# Patient Record
Sex: FEMALE | Race: WHITE | Employment: FULL TIME | URBAN - METROPOLITAN AREA
[De-identification: names, ages, dates, MRNs, and addresses within clinical notes are randomized per-mention and may not be internally consistent; named-entity substitution may affect disease eponyms.]

---

## 2017-04-26 LAB
CHOLESTEROL, TOTAL: 165 MG/DL
CHOLESTEROL/HDL RATIO: 2.4
HDLC SERPL-MCNC: 68 MG/DL (ref 35–70)
LDL CHOLESTEROL CALCULATED: 85 MG/DL (ref 0–160)
TRIGL SERPL-MCNC: 62 MG/DL
VLDLC SERPL CALC-MCNC: NORMAL MG/DL

## 2017-05-04 ENCOUNTER — TELEPHONE (OUTPATIENT)
Dept: FAMILY MEDICINE CLINIC | Age: 37
End: 2017-05-04

## 2017-09-12 ENCOUNTER — OFFICE VISIT (OUTPATIENT)
Dept: FAMILY MEDICINE CLINIC | Age: 37
End: 2017-09-12
Payer: COMMERCIAL

## 2017-09-12 VITALS
HEIGHT: 68 IN | HEART RATE: 68 BPM | DIASTOLIC BLOOD PRESSURE: 80 MMHG | TEMPERATURE: 98.2 F | SYSTOLIC BLOOD PRESSURE: 110 MMHG | RESPIRATION RATE: 16 BRPM | WEIGHT: 213 LBS | BODY MASS INDEX: 32.28 KG/M2

## 2017-09-12 DIAGNOSIS — J30.9 ALLERGIC RHINITIS, UNSPECIFIED ALLERGIC RHINITIS TRIGGER, UNSPECIFIED RHINITIS SEASONALITY: ICD-10-CM

## 2017-09-12 DIAGNOSIS — D72.18 CHURG-STRAUSS SYNDROME (HCC): ICD-10-CM

## 2017-09-12 DIAGNOSIS — Z00.00 WELL ADULT EXAM: Primary | ICD-10-CM

## 2017-09-12 DIAGNOSIS — J45.20 MILD INTERMITTENT ASTHMA WITHOUT COMPLICATION: ICD-10-CM

## 2017-09-12 DIAGNOSIS — M30.1 CHURG-STRAUSS SYNDROME (HCC): ICD-10-CM

## 2017-09-12 DIAGNOSIS — K21.9 GASTROESOPHAGEAL REFLUX DISEASE, ESOPHAGITIS PRESENCE NOT SPECIFIED: ICD-10-CM

## 2017-09-12 PROCEDURE — 99395 PREV VISIT EST AGE 18-39: CPT | Performed by: FAMILY MEDICINE

## 2017-09-12 RX ORDER — OMEPRAZOLE 20 MG/1
20 CAPSULE, DELAYED RELEASE ORAL DAILY
Qty: 90 CAPSULE | Refills: 3 | Status: SHIPPED | OUTPATIENT
Start: 2017-09-12 | End: 2018-08-31 | Stop reason: SDUPTHER

## 2017-09-12 RX ORDER — FLUTICASONE PROPIONATE 50 MCG
2 SPRAY, SUSPENSION (ML) NASAL
COMMUNITY
End: 2018-09-18 | Stop reason: ALTCHOICE

## 2017-09-12 ASSESSMENT — PATIENT HEALTH QUESTIONNAIRE - PHQ9
SUM OF ALL RESPONSES TO PHQ9 QUESTIONS 1 & 2: 0
2. FEELING DOWN, DEPRESSED OR HOPELESS: 0
1. LITTLE INTEREST OR PLEASURE IN DOING THINGS: 0
SUM OF ALL RESPONSES TO PHQ QUESTIONS 1-9: 0

## 2017-09-12 ASSESSMENT — ENCOUNTER SYMPTOMS
BLOOD IN STOOL: 0
NAUSEA: 0
CONSTIPATION: 0
DIARRHEA: 0
ABDOMINAL PAIN: 0
SHORTNESS OF BREATH: 0
VOMITING: 0
ANAL BLEEDING: 0
CHEST TIGHTNESS: 0

## 2017-11-28 ENCOUNTER — OFFICE VISIT (OUTPATIENT)
Dept: RHEUMATOLOGY | Age: 37
End: 2017-11-28
Payer: COMMERCIAL

## 2017-11-28 VITALS
SYSTOLIC BLOOD PRESSURE: 132 MMHG | HEIGHT: 68 IN | DIASTOLIC BLOOD PRESSURE: 86 MMHG | WEIGHT: 210.4 LBS | BODY MASS INDEX: 31.89 KG/M2 | RESPIRATION RATE: 16 BRPM | HEART RATE: 97 BPM

## 2017-11-28 DIAGNOSIS — D72.18 CHURG-STRAUSS SYNDROME (HCC): Primary | ICD-10-CM

## 2017-11-28 DIAGNOSIS — M30.1 CHURG-STRAUSS SYNDROME (HCC): Primary | ICD-10-CM

## 2017-11-28 DIAGNOSIS — Z79.52 CURRENT CHRONIC USE OF SYSTEMIC STEROIDS: ICD-10-CM

## 2017-11-28 PROCEDURE — G8427 DOCREV CUR MEDS BY ELIG CLIN: HCPCS | Performed by: INTERNAL MEDICINE

## 2017-11-28 PROCEDURE — 99244 OFF/OP CNSLTJ NEW/EST MOD 40: CPT | Performed by: INTERNAL MEDICINE

## 2017-11-28 PROCEDURE — G8417 CALC BMI ABV UP PARAM F/U: HCPCS | Performed by: INTERNAL MEDICINE

## 2017-11-28 PROCEDURE — G8484 FLU IMMUNIZE NO ADMIN: HCPCS | Performed by: INTERNAL MEDICINE

## 2017-11-28 RX ORDER — CLARITHROMYCIN 500 MG/1
500 TABLET, COATED ORAL DAILY
COMMUNITY
End: 2018-04-17

## 2017-11-28 RX ORDER — BUDESONIDE 0.5 MG/2ML
1 INHALANT ORAL 2 TIMES DAILY
COMMUNITY
End: 2018-09-18 | Stop reason: ALTCHOICE

## 2017-11-28 ASSESSMENT — ENCOUNTER SYMPTOMS
EYES NEGATIVE: 1
HEMOPTYSIS: 0
COUGH: 0
WHEEZING: 0
SHORTNESS OF BREATH: 0
HEARTBURN: 1

## 2017-11-28 NOTE — PROGRESS NOTES
[facility]  Rheumatology Adult Consult/Referral Note       11/28/2017  MRN: [id number]      HPI:   Gasper Byrnes  is a(n)37 y.o. female with a hx of churg-Connie syndrome, Chronic sinusitis, GERD, Mild intermittent Asthma, allergic rhinitis referred by Dr. Niki Burciaga for evaluation of her Churg-Connie. Per pt and chart review she had a long standing h/o allergies and asthma, that worsened around 2002. In 2006 she began having progressive sxs including sinonasal symptoms,  Surgery in 10/2007 with septal deviation and polyp removal. Severe recurrent asthma, increasing eosinophilia up to 55%, severe arthralgias w/o swelling, fatigue, numbness in bilateral feet, and skin rash (legs and forearms) with bx revealing pervsacular dermatitis with prominent eosinophils. Stablized with prednisone during 2008 pregnancy. Started seeing Dr. Shane Stark in 2008 and diagnosed with Gegekareem Ashton (EGPA). She had Recurrence of rash in 2009 on b/l lower extremities that resolved with prednisone but returned with tapering. Initialy evaluated at [place] [facility] 8/18/09 at which time Azathioprine was discussed which she started on 9/09. Leah Rankin Sxs improved and prednisone was taper w/o recurrence of rash. She wanted to pursue another child so the imuran was tapered and discontinued on 2/12. She conceived and her pregnancy went well and was far less complicated than her first pregnancy. Daughter deliver on 12/12. Increased sinonasal sxs and asthmatic features with peripheral einophila increased around her 7/13 evaluation. Imuran restarted. In 3/2016 Mepolizumab 100mg/victor hugo was started and her asthma significantly improved, w/ improvement of the PFT, Exertional SOB. Pt weaned off Imuran on 9/16. Prednisone down to 7mg daily 1/17. continued recurrent sinusitis from nasal polyps but her asthma and vasculitis were controlled. SEEING Allergist and ENT currently. Last prednisone burst ~ 6 months ago.         She report diagnoses linked to this encounter. Churg-crystal syndrome  - dx'ed 5/2009: prolonged h/o allergies, recurrent asthma. Increased sinonasal sx's aroudn 2006. 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 (+) skin rash on bilateral legs, and Rt arm w/ biopsy revealing perivascular dermatitis w/ prominent eosinophils. Paresthesias resoled after therapy started. Negative ANCA's in 2009.   - Prior tx AZA (9/09-2/12,7/13-9/16)  - followed and Treated by Dr. Opal Shearer at [facility] since 2009. - Current therapy    - Mepolizumab 100mg q month (3/16-present). Noted  Dr. Opal Shearer recommendation of increasing dosing if becomes FDA approved. Currently being administered by Allergist in [place], [place]   - Prednisone currently 5mg daily,(5/09-present)  intermittent prednisone burst.    - discussed restarting imuran as an attempt to wean the prednisone and improved sinopulmonary sx's. Pt reports improvement of sinopulmonary sx's  with higher dosing of prednisone. Chronic systemic steroids. - prednisone 5mg daily with intermittent burst depending upon sinus issues. - discussed calcium and vitamin supplementation.   - DXA normal in 2016    Medication monitoring:   - CBC, CMP, ESR, CRP, U/A     Return in about 18 weeks (around 4/3/2018).  ~2 months after appointment with Dr. Opal Shearer     Electronically signed by Amrita Sung DO on 11/28/2017 at 1:53 PM

## 2018-01-11 ENCOUNTER — HOSPITAL ENCOUNTER (OUTPATIENT)
Dept: GENERAL RADIOLOGY | Age: 38
Discharge: HOME | End: 2018-01-11

## 2018-01-11 ENCOUNTER — HOSPITAL ENCOUNTER (OUTPATIENT)
Age: 38
Discharge: HOME | End: 2018-01-11

## 2018-01-11 DIAGNOSIS — R52 PAIN: ICD-10-CM

## 2018-02-15 ENCOUNTER — HOSPITAL ENCOUNTER (OUTPATIENT)
Dept: ULTRASOUND IMAGING | Age: 38
Discharge: HOME | End: 2018-02-15

## 2018-02-15 ENCOUNTER — HOSPITAL ENCOUNTER (OUTPATIENT)
Age: 38
Discharge: HOME | End: 2018-02-15

## 2018-02-15 ENCOUNTER — HOSPITAL ENCOUNTER (OUTPATIENT)
Dept: GENERAL RADIOLOGY | Age: 38
Discharge: HOME | End: 2018-02-15

## 2018-02-15 DIAGNOSIS — S80.11XA CONTUSION OF RIGHT LOWER LEG, INITIAL ENCOUNTER: ICD-10-CM

## 2018-02-15 DIAGNOSIS — R52 PAIN: ICD-10-CM

## 2018-02-15 PROCEDURE — 76882 US LMTD JT/FCL EVL NVASC XTR: CPT

## 2018-04-17 ENCOUNTER — OFFICE VISIT (OUTPATIENT)
Dept: RHEUMATOLOGY | Age: 38
End: 2018-04-17
Payer: COMMERCIAL

## 2018-04-17 VITALS
HEIGHT: 68 IN | BODY MASS INDEX: 32.98 KG/M2 | HEART RATE: 100 BPM | RESPIRATION RATE: 17 BRPM | DIASTOLIC BLOOD PRESSURE: 91 MMHG | WEIGHT: 217.6 LBS | SYSTOLIC BLOOD PRESSURE: 136 MMHG

## 2018-04-17 DIAGNOSIS — D72.18 CHURG-STRAUSS SYNDROME (HCC): Primary | ICD-10-CM

## 2018-04-17 DIAGNOSIS — M30.1 CHURG-STRAUSS SYNDROME (HCC): Primary | ICD-10-CM

## 2018-04-17 DIAGNOSIS — Z79.52 CURRENT CHRONIC USE OF SYSTEMIC STEROIDS: ICD-10-CM

## 2018-04-17 PROCEDURE — G8417 CALC BMI ABV UP PARAM F/U: HCPCS | Performed by: INTERNAL MEDICINE

## 2018-04-17 PROCEDURE — 1036F TOBACCO NON-USER: CPT | Performed by: INTERNAL MEDICINE

## 2018-04-17 PROCEDURE — 99214 OFFICE O/P EST MOD 30 MIN: CPT | Performed by: INTERNAL MEDICINE

## 2018-04-17 PROCEDURE — G8427 DOCREV CUR MEDS BY ELIG CLIN: HCPCS | Performed by: INTERNAL MEDICINE

## 2018-04-17 RX ORDER — PREDNISONE 1 MG/1
3 TABLET ORAL DAILY
Qty: 90 TABLET | Refills: 1 | Status: SHIPPED | OUTPATIENT
Start: 2018-04-17 | End: 2018-10-02 | Stop reason: SDUPTHER

## 2018-04-17 RX ORDER — SULFAMETHOXAZOLE AND TRIMETHOPRIM 800; 160 MG/1; MG/1
TABLET ORAL
Qty: 36 TABLET | Refills: 1 | Status: SHIPPED | OUTPATIENT
Start: 2018-04-17 | End: 2018-10-02 | Stop reason: SDUPTHER

## 2018-04-17 RX ORDER — PREDNISONE 1 MG/1
20 TABLET ORAL DAILY
Qty: 120 TABLET | Refills: 1 | Status: SHIPPED | OUTPATIENT
Start: 2018-04-17 | End: 2018-10-02 | Stop reason: SDUPTHER

## 2018-04-17 ASSESSMENT — ENCOUNTER SYMPTOMS
DOUBLE VISION: 0
EYE DISCHARGE: 0
HEARTBURN: 1
SHORTNESS OF BREATH: 0
WHEEZING: 0
COUGH: 0
BLURRED VISION: 0
EYE PAIN: 0
SINUS PAIN: 1
PHOTOPHOBIA: 0
HEMOPTYSIS: 0

## 2018-05-10 LAB
CHOLESTEROL, TOTAL: 168 MG/DL
CHOLESTEROL/HDL RATIO: NORMAL
HDLC SERPL-MCNC: 69 MG/DL (ref 35–70)
LDL CHOLESTEROL CALCULATED: 89 MG/DL (ref 0–160)
TRIGL SERPL-MCNC: 52 MG/DL
VLDLC SERPL CALC-MCNC: NORMAL MG/DL

## 2018-08-31 DIAGNOSIS — K21.9 GASTROESOPHAGEAL REFLUX DISEASE, ESOPHAGITIS PRESENCE NOT SPECIFIED: ICD-10-CM

## 2018-08-31 RX ORDER — OMEPRAZOLE 20 MG/1
CAPSULE, DELAYED RELEASE ORAL
Qty: 90 CAPSULE | Refills: 3 | Status: SHIPPED | OUTPATIENT
Start: 2018-08-31 | End: 2019-08-07 | Stop reason: SDUPTHER

## 2018-08-31 NOTE — TELEPHONE ENCOUNTER
EP request from [facility] for refill on Omeprazole  Last seen 9/12/17 and next appt 9/18/18  Order pending

## 2018-09-18 ENCOUNTER — OFFICE VISIT (OUTPATIENT)
Dept: FAMILY MEDICINE CLINIC | Age: 38
End: 2018-09-18
Payer: COMMERCIAL

## 2018-09-18 VITALS
WEIGHT: 195 LBS | DIASTOLIC BLOOD PRESSURE: 80 MMHG | RESPIRATION RATE: 16 BRPM | TEMPERATURE: 97.3 F | HEART RATE: 76 BPM | BODY MASS INDEX: 29.86 KG/M2 | SYSTOLIC BLOOD PRESSURE: 124 MMHG

## 2018-09-18 DIAGNOSIS — R73.01 IFG (IMPAIRED FASTING GLUCOSE): ICD-10-CM

## 2018-09-18 DIAGNOSIS — J45.20 MILD INTERMITTENT ASTHMA WITHOUT COMPLICATION: ICD-10-CM

## 2018-09-18 DIAGNOSIS — D72.18 CHURG-STRAUSS SYNDROME (HCC): ICD-10-CM

## 2018-09-18 DIAGNOSIS — K21.9 GASTROESOPHAGEAL REFLUX DISEASE, ESOPHAGITIS PRESENCE NOT SPECIFIED: ICD-10-CM

## 2018-09-18 DIAGNOSIS — J30.9 ALLERGIC RHINITIS, UNSPECIFIED SEASONALITY, UNSPECIFIED TRIGGER: ICD-10-CM

## 2018-09-18 DIAGNOSIS — Z00.00 WELL ADULT EXAM: Primary | ICD-10-CM

## 2018-09-18 DIAGNOSIS — M30.1 CHURG-STRAUSS SYNDROME (HCC): ICD-10-CM

## 2018-09-18 PROCEDURE — 99395 PREV VISIT EST AGE 18-39: CPT | Performed by: FAMILY MEDICINE

## 2018-09-18 ASSESSMENT — ENCOUNTER SYMPTOMS
VOMITING: 0
DIARRHEA: 0
ANAL BLEEDING: 0
NAUSEA: 0
ABDOMINAL PAIN: 0
CHEST TIGHTNESS: 0
SHORTNESS OF BREATH: 0
BLOOD IN STOOL: 0
CONSTIPATION: 0

## 2018-09-18 ASSESSMENT — PATIENT HEALTH QUESTIONNAIRE - PHQ9
SUM OF ALL RESPONSES TO PHQ QUESTIONS 1-9: 0
SUM OF ALL RESPONSES TO PHQ QUESTIONS 1-9: 0
2. FEELING DOWN, DEPRESSED OR HOPELESS: 0
SUM OF ALL RESPONSES TO PHQ9 QUESTIONS 1 & 2: 0
1. LITTLE INTEREST OR PLEASURE IN DOING THINGS: 0

## 2018-09-18 NOTE — PROGRESS NOTES
Nose normal.   Mouth/Throat: Oropharynx is clear and moist.   Eyes: Pupils are equal, round, and reactive to light. Conjunctivae and EOM are normal.   Neck: Normal range of motion. Neck supple. Cardiovascular: Normal rate, regular rhythm, normal heart sounds and intact distal pulses. Pulmonary/Chest: Effort normal and breath sounds normal.   Abdominal: Soft. Bowel sounds are normal.   Musculoskeletal: Normal range of motion. Neurological: She is alert and oriented to person, place, and time. She has normal reflexes. Skin: Skin is warm and dry. Psychiatric: She has a normal mood and affect. Her behavior is normal. Judgment and thought content normal.   Nursing note and vitals reviewed. Lab Results   Component Value Date    CHOL 168 05/10/2018    TRIG 52 05/10/2018    HDL 69 05/10/2018    LDLCALC 89 05/10/2018       Lab Results   Component Value Date    GLUCOSE 100 05/11/2016         Immunization History   Administered Date(s) Administered    Influenza Virus Vaccine 10/01/2014, 10/01/2015    Pneumococcal Polysaccharide (Gvemcwent25) 01/01/2008    Tdap (Boostrix, Adacel) 12/17/2012       Health Maintenance   Topic Date Due    Cervical cancer screen  05/01/2018    Flu vaccine (1) 09/01/2018    DTaP/Tdap/Td vaccine (2 - Td) 12/17/2022    Pneumococcal med risk  Completed    HIV screen  Completed       AAA ultrasound (Male, 65-75, smoked ever) indicated at this time? NO  CT Lung Screen (55-80, 30 pk-yrs, smoking or quit <15 years) indicated at this time? NO    Future Appointments  Date Time Provider Mihaela Dong   10/2/2018 8:00 AM Darlys Kanner, DO [facility] - [place] [place]         ASSESSMENT       Diagnosis Orders   1. Well adult exam     2. Churg-Connie syndrome ([address]     3. IFG (impaired fasting glucose)  Hemoglobin A1C   4. Allergic rhinitis, unspecified seasonality, unspecified trigger     5. Gastroesophageal reflux disease, esophagitis presence not specified     6.  Mild intermittent asthma without complication         PLAN     Encouraged annual FLU VACCINE after October 1st- to get per employer. Encouraged PREVNAR 13 at this time- pt to let us know when desires   Plan 2nd PNEUMOVAX 23 (8 weeks later)  due to chronic immunosuppression with corticosteroids.     PAP/PELVIC management per Dr. Kassandra Mott- last appt 6/2018- to follow up annually. DEXA management per [place]. Bhavin Stahl [place] [facility] [place] (done at [facility]) due to persistent steroid use- pt to drop off to office. Please decrease Calcium with Vitamin D supplementation (Viactiv Chews) to 1 pill daily if ok with Dr. Sami Sargent due to recent reports of increasing risk of calcifying coronary arteries. Check HGA1C at this time- order given. Remaining labs managed per [facility] [place]. Continue Omeprazole- 20 mg- 1 pill daily. Follow up with Dr. Giorgio Lanza, Dr. Bhaivn Stahl [facility];inic- Rheumatology) , Dr. Sukumar Smith (Allergy/ Asthma), and Dr. River Castellano (Sinus). Continue to work on diet, exercise, and weight loss for optimal cardiovascular health. Continue current medicines. No refills needed today.    Follow up in 12 months.        Electronically signed by Lloyd Kathleen MD on 9/18/2018 at 8:39 AM

## 2018-10-02 ENCOUNTER — OFFICE VISIT (OUTPATIENT)
Dept: RHEUMATOLOGY | Age: 38
End: 2018-10-02
Payer: COMMERCIAL

## 2018-10-02 VITALS
WEIGHT: 192.8 LBS | SYSTOLIC BLOOD PRESSURE: 129 MMHG | OXYGEN SATURATION: 100 % | BODY MASS INDEX: 29.53 KG/M2 | DIASTOLIC BLOOD PRESSURE: 82 MMHG | HEART RATE: 70 BPM

## 2018-10-02 DIAGNOSIS — D72.18 CHURG-STRAUSS SYNDROME (HCC): Primary | ICD-10-CM

## 2018-10-02 DIAGNOSIS — M30.1 CHURG-STRAUSS SYNDROME (HCC): Primary | ICD-10-CM

## 2018-10-02 DIAGNOSIS — Z79.52 CURRENT CHRONIC USE OF SYSTEMIC STEROIDS: ICD-10-CM

## 2018-10-02 PROCEDURE — G8417 CALC BMI ABV UP PARAM F/U: HCPCS | Performed by: INTERNAL MEDICINE

## 2018-10-02 PROCEDURE — 99213 OFFICE O/P EST LOW 20 MIN: CPT | Performed by: INTERNAL MEDICINE

## 2018-10-02 PROCEDURE — G8427 DOCREV CUR MEDS BY ELIG CLIN: HCPCS | Performed by: INTERNAL MEDICINE

## 2018-10-02 PROCEDURE — G8484 FLU IMMUNIZE NO ADMIN: HCPCS | Performed by: INTERNAL MEDICINE

## 2018-10-02 PROCEDURE — 1036F TOBACCO NON-USER: CPT | Performed by: INTERNAL MEDICINE

## 2018-10-02 RX ORDER — SULFAMETHOXAZOLE AND TRIMETHOPRIM 800; 160 MG/1; MG/1
TABLET ORAL
Qty: 36 TABLET | Refills: 1 | Status: SHIPPED | OUTPATIENT
Start: 2018-10-02 | End: 2019-10-01 | Stop reason: SDUPTHER

## 2018-10-02 RX ORDER — PREDNISONE 1 MG/1
20 TABLET ORAL DAILY
Qty: 120 TABLET | Refills: 1 | Status: SHIPPED | OUTPATIENT
Start: 2018-10-02 | End: 2019-04-09 | Stop reason: SDUPTHER

## 2018-10-02 RX ORDER — PREDNISONE 1 MG/1
3 TABLET ORAL DAILY
Qty: 90 TABLET | Refills: 1 | Status: SHIPPED | OUTPATIENT
Start: 2018-10-02 | End: 2019-04-09 | Stop reason: SDUPTHER

## 2018-10-02 ASSESSMENT — ENCOUNTER SYMPTOMS
PHOTOPHOBIA: 0
ORTHOPNEA: 0
HEMOPTYSIS: 0
DOUBLE VISION: 0
BACK PAIN: 0
SHORTNESS OF BREATH: 0
WHEEZING: 0
EYE DISCHARGE: 0
SINUS PAIN: 1
EYE PAIN: 0
HEARTBURN: 1
BLURRED VISION: 0
COUGH: 0

## 2018-10-02 NOTE — PROGRESS NOTES
1500mg day of supplementation, and vitamin D supplementation.   - DXA normal in 2016  - Bactrim DS M/W/F     Medication monitoring:   - CBC, CMP, ESR, CRP, U/A -- request labs from [facility]. Return in about 6 months (around 4/2/2019).     Electronically signed by Elbert Chery DO on 10/2/2018 at 7:55 AM

## 2018-10-19 LAB
AVERAGE GLUCOSE: 102
HBA1C MFR BLD: 5.4 %

## 2019-03-01 ENCOUNTER — TELEPHONE (OUTPATIENT)
Dept: FAMILY MEDICINE CLINIC | Age: 39
End: 2019-03-01

## 2019-03-18 ENCOUNTER — TELEPHONE (OUTPATIENT)
Dept: FAMILY MEDICINE CLINIC | Age: 39
End: 2019-03-18

## 2019-03-19 ENCOUNTER — HOSPITAL ENCOUNTER (INPATIENT)
Age: 39
LOS: 3 days | Discharge: HOME | DRG: 419 | End: 2019-03-22
Attending: INTERNAL MEDICINE | Admitting: INTERNAL MEDICINE
Payer: COMMERCIAL

## 2019-03-19 ENCOUNTER — NURSE TRIAGE (OUTPATIENT)
Dept: ADMINISTRATIVE | Age: 39
End: 2019-03-19

## 2019-03-19 ENCOUNTER — APPOINTMENT (OUTPATIENT)
Dept: ULTRASOUND IMAGING | Age: 39
DRG: 419 | End: 2019-03-19
Payer: COMMERCIAL

## 2019-03-19 ENCOUNTER — TELEPHONE (OUTPATIENT)
Dept: FAMILY MEDICINE CLINIC | Age: 39
End: 2019-03-19

## 2019-03-19 ENCOUNTER — APPOINTMENT (OUTPATIENT)
Dept: MRI IMAGING | Age: 39
DRG: 419 | End: 2019-03-19
Payer: COMMERCIAL

## 2019-03-19 DIAGNOSIS — R10.11 ABDOMINAL PAIN, RIGHT UPPER QUADRANT: Primary | ICD-10-CM

## 2019-03-19 DIAGNOSIS — R74.8 ELEVATED LIVER ENZYMES: ICD-10-CM

## 2019-03-19 PROBLEM — K80.43 CHOLEDOCHOLITHIASIS WITH ACUTE CHOLECYSTITIS WITH OBSTRUCTION: Status: ACTIVE | Noted: 2019-03-19

## 2019-03-19 LAB
ALBUMIN SERPL-MCNC: 3.9 G/DL (ref 3.5–5.1)
ALP BLD-CCNC: 207 U/L (ref 38–126)
ALT SERPL-CCNC: 341 U/L (ref 11–66)
ANION GAP SERPL CALCULATED.3IONS-SCNC: 16 MEQ/L (ref 8–16)
AST SERPL-CCNC: 416 U/L (ref 5–40)
BACTERIA: ABNORMAL /HPF
BASOPHILS # BLD: 0.5 %
BASOPHILS ABSOLUTE: 0 THOU/MM3 (ref 0–0.1)
BILIRUB SERPL-MCNC: 4.3 MG/DL (ref 0.3–1.2)
BILIRUBIN URINE: ABNORMAL
BLOOD, URINE: ABNORMAL
BUN BLDV-MCNC: 9 MG/DL (ref 7–22)
CALCIUM SERPL-MCNC: 9.5 MG/DL (ref 8.5–10.5)
CASTS 2: ABNORMAL /LPF
CASTS UA: ABNORMAL /LPF
CHARACTER, URINE: CLEAR
CHLORIDE BLD-SCNC: 101 MEQ/L (ref 98–111)
CO2: 23 MEQ/L (ref 23–33)
COLOR: ABNORMAL
CREAT SERPL-MCNC: 0.4 MG/DL (ref 0.4–1.2)
CRYSTALS, UA: ABNORMAL
EKG ATRIAL RATE: 62 BPM
EKG P AXIS: 17 DEGREES
EKG P-R INTERVAL: 132 MS
EKG Q-T INTERVAL: 488 MS
EKG QRS DURATION: 82 MS
EKG QTC CALCULATION (BAZETT): 495 MS
EKG R AXIS: 51 DEGREES
EKG T AXIS: 61 DEGREES
EKG VENTRICULAR RATE: 62 BPM
EOSINOPHIL # BLD: 0.2 %
EOSINOPHILS ABSOLUTE: 0 THOU/MM3 (ref 0–0.4)
EPITHELIAL CELLS, UA: ABNORMAL /HPF
ERYTHROCYTE [DISTWIDTH] IN BLOOD BY AUTOMATED COUNT: 13.2 % (ref 11.5–14.5)
ERYTHROCYTE [DISTWIDTH] IN BLOOD BY AUTOMATED COUNT: 44.6 FL (ref 35–45)
GFR SERPL CREATININE-BSD FRML MDRD: > 90 ML/MIN/1.73M2
GLUCOSE BLD-MCNC: 119 MG/DL (ref 70–108)
GLUCOSE URINE: ABNORMAL MG/DL
HCT VFR BLD CALC: 47.8 % (ref 37–47)
HEMOGLOBIN: 16.3 GM/DL (ref 12–16)
ICTOTEST: POSITIVE
IMMATURE GRANS (ABS): 0.03 THOU/MM3 (ref 0–0.07)
IMMATURE GRANULOCYTES: 0.4 %
KETONES, URINE: ABNORMAL
LEUKOCYTE ESTERASE, URINE: ABNORMAL
LIPASE: 19.3 U/L (ref 5.6–51.3)
LYMPHOCYTES # BLD: 17.2 %
LYMPHOCYTES ABSOLUTE: 1.4 THOU/MM3 (ref 1–4.8)
MCH RBC QN AUTO: 30.9 PG (ref 26–33)
MCHC RBC AUTO-ENTMCNC: 34.1 GM/DL (ref 32.2–35.5)
MCV RBC AUTO: 90.7 FL (ref 81–99)
MISCELLANEOUS 2: ABNORMAL
MONOCYTES # BLD: 8.8 %
MONOCYTES ABSOLUTE: 0.7 THOU/MM3 (ref 0.4–1.3)
NITRITE, URINE: ABNORMAL
NUCLEATED RED BLOOD CELLS: 0 /100 WBC
OSMOLALITY CALCULATION: 279.2 MOSMOL/KG (ref 275–300)
PH UA: ABNORMAL (ref 5–9)
PLATELET # BLD: 263 THOU/MM3 (ref 130–400)
PMV BLD AUTO: 12.3 FL (ref 9.4–12.4)
POTASSIUM SERPL-SCNC: 4.1 MEQ/L (ref 3.5–5.2)
PREGNANCY, SERUM: NEGATIVE
PROTEIN UA: ABNORMAL
RBC # BLD: 5.27 MILL/MM3 (ref 4.2–5.4)
RBC URINE: ABNORMAL /HPF
REASON FOR REJECTION: NORMAL
REJECTED TEST: NORMAL
RENAL EPITHELIAL, UA: ABNORMAL
SEG NEUTROPHILS: 72.9 %
SEGMENTED NEUTROPHILS ABSOLUTE COUNT: 6.1 THOU/MM3 (ref 1.8–7.7)
SODIUM BLD-SCNC: 140 MEQ/L (ref 135–145)
SPECIFIC GRAVITY, URINE: ABNORMAL (ref 1–1.03)
TOTAL PROTEIN: 7.1 G/DL (ref 6.1–8)
TROPONIN T: < 0.01 NG/ML
TROPONIN T: < 0.01 NG/ML
UROBILINOGEN, URINE: ABNORMAL EU/DL (ref 0–1)
WBC # BLD: 8.4 THOU/MM3 (ref 4.8–10.8)
WBC UA: ABNORMAL /HPF
YEAST: ABNORMAL

## 2019-03-19 PROCEDURE — 6370000000 HC RX 637 (ALT 250 FOR IP): Performed by: INTERNAL MEDICINE

## 2019-03-19 PROCEDURE — 6360000002 HC RX W HCPCS: Performed by: INTERNAL MEDICINE

## 2019-03-19 PROCEDURE — 80053 COMPREHEN METABOLIC PANEL: CPT

## 2019-03-19 PROCEDURE — 6360000002 HC RX W HCPCS: Performed by: EMERGENCY MEDICINE

## 2019-03-19 PROCEDURE — 93005 ELECTROCARDIOGRAM TRACING: CPT | Performed by: EMERGENCY MEDICINE

## 2019-03-19 PROCEDURE — 2580000003 HC RX 258: Performed by: EMERGENCY MEDICINE

## 2019-03-19 PROCEDURE — 96374 THER/PROPH/DIAG INJ IV PUSH: CPT

## 2019-03-19 PROCEDURE — 96375 TX/PRO/DX INJ NEW DRUG ADDON: CPT

## 2019-03-19 PROCEDURE — 81001 URINALYSIS AUTO W/SCOPE: CPT

## 2019-03-19 PROCEDURE — 1200000000 HC SEMI PRIVATE

## 2019-03-19 PROCEDURE — 2580000003 HC RX 258: Performed by: INTERNAL MEDICINE

## 2019-03-19 PROCEDURE — 74183 MRI ABD W/O CNTR FLWD CNTR: CPT

## 2019-03-19 PROCEDURE — 85025 COMPLETE CBC W/AUTO DIFF WBC: CPT

## 2019-03-19 PROCEDURE — 84484 ASSAY OF TROPONIN QUANT: CPT

## 2019-03-19 PROCEDURE — A9579 GAD-BASE MR CONTRAST NOS,1ML: HCPCS | Performed by: INTERNAL MEDICINE

## 2019-03-19 PROCEDURE — 99222 1ST HOSP IP/OBS MODERATE 55: CPT | Performed by: INTERNAL MEDICINE

## 2019-03-19 PROCEDURE — 83690 ASSAY OF LIPASE: CPT

## 2019-03-19 PROCEDURE — 96376 TX/PRO/DX INJ SAME DRUG ADON: CPT

## 2019-03-19 PROCEDURE — 36415 COLL VENOUS BLD VENIPUNCTURE: CPT

## 2019-03-19 PROCEDURE — 93010 ELECTROCARDIOGRAM REPORT: CPT | Performed by: INTERNAL MEDICINE

## 2019-03-19 PROCEDURE — 2709999900 HC NON-CHARGEABLE SUPPLY

## 2019-03-19 PROCEDURE — 76705 ECHO EXAM OF ABDOMEN: CPT

## 2019-03-19 PROCEDURE — 84703 CHORIONIC GONADOTROPIN ASSAY: CPT

## 2019-03-19 PROCEDURE — 99285 EMERGENCY DEPT VISIT HI MDM: CPT

## 2019-03-19 PROCEDURE — 6360000004 HC RX CONTRAST MEDICATION: Performed by: INTERNAL MEDICINE

## 2019-03-19 PROCEDURE — 87040 BLOOD CULTURE FOR BACTERIA: CPT

## 2019-03-19 RX ORDER — METOCLOPRAMIDE HYDROCHLORIDE 5 MG/ML
10 INJECTION INTRAMUSCULAR; INTRAVENOUS ONCE
Status: COMPLETED | OUTPATIENT
Start: 2019-03-19 | End: 2019-03-19

## 2019-03-19 RX ORDER — PROMETHAZINE HYDROCHLORIDE 25 MG/1
25 TABLET ORAL EVERY 6 HOURS PRN
Status: DISCONTINUED | OUTPATIENT
Start: 2019-03-19 | End: 2019-03-22 | Stop reason: HOSPADM

## 2019-03-19 RX ORDER — FENTANYL CITRATE 50 UG/ML
50 INJECTION, SOLUTION INTRAMUSCULAR; INTRAVENOUS ONCE
Status: COMPLETED | OUTPATIENT
Start: 2019-03-19 | End: 2019-03-19

## 2019-03-19 RX ORDER — SODIUM CHLORIDE, SODIUM LACTATE, POTASSIUM CHLORIDE, CALCIUM CHLORIDE 600; 310; 30; 20 MG/100ML; MG/100ML; MG/100ML; MG/100ML
INJECTION, SOLUTION INTRAVENOUS CONTINUOUS
Status: DISCONTINUED | OUTPATIENT
Start: 2019-03-19 | End: 2019-03-22

## 2019-03-19 RX ORDER — PANTOPRAZOLE SODIUM 40 MG/1
40 TABLET, DELAYED RELEASE ORAL
Status: DISCONTINUED | OUTPATIENT
Start: 2019-03-20 | End: 2019-03-22 | Stop reason: HOSPADM

## 2019-03-19 RX ORDER — FENTANYL CITRATE 50 UG/ML
INJECTION, SOLUTION INTRAMUSCULAR; INTRAVENOUS
Status: DISPENSED
Start: 2019-03-19 | End: 2019-03-19

## 2019-03-19 RX ORDER — NORGESTIMATE AND ETHINYL ESTRADIOL 0.25-0.035
1 KIT ORAL DAILY
Status: DISCONTINUED | OUTPATIENT
Start: 2019-03-19 | End: 2019-03-22 | Stop reason: HOSPADM

## 2019-03-19 RX ORDER — IBUPROFEN 600 MG/1
600 TABLET ORAL EVERY 6 HOURS PRN
Status: DISCONTINUED | OUTPATIENT
Start: 2019-03-19 | End: 2019-03-21

## 2019-03-19 RX ORDER — BUTALBITAL, ACETAMINOPHEN AND CAFFEINE 50; 325; 40 MG/1; MG/1; MG/1
2 TABLET ORAL ONCE
Status: DISCONTINUED | OUTPATIENT
Start: 2019-03-19 | End: 2019-03-22 | Stop reason: HOSPADM

## 2019-03-19 RX ORDER — SODIUM CHLORIDE 0.9 % (FLUSH) 0.9 %
10 SYRINGE (ML) INJECTION PRN
Status: DISCONTINUED | OUTPATIENT
Start: 2019-03-19 | End: 2019-03-22 | Stop reason: SDUPTHER

## 2019-03-19 RX ORDER — PROMETHAZINE HYDROCHLORIDE 25 MG/1
25 SUPPOSITORY RECTAL EVERY 6 HOURS PRN
Status: DISCONTINUED | OUTPATIENT
Start: 2019-03-19 | End: 2019-03-22 | Stop reason: HOSPADM

## 2019-03-19 RX ORDER — 0.9 % SODIUM CHLORIDE 0.9 %
1000 INTRAVENOUS SOLUTION INTRAVENOUS ONCE
Status: COMPLETED | OUTPATIENT
Start: 2019-03-19 | End: 2019-03-19

## 2019-03-19 RX ORDER — FENTANYL CITRATE 50 UG/ML
25 INJECTION, SOLUTION INTRAMUSCULAR; INTRAVENOUS ONCE
Status: COMPLETED | OUTPATIENT
Start: 2019-03-19 | End: 2019-03-19

## 2019-03-19 RX ORDER — ONDANSETRON 2 MG/ML
4 INJECTION INTRAMUSCULAR; INTRAVENOUS ONCE
Status: COMPLETED | OUTPATIENT
Start: 2019-03-19 | End: 2019-03-19

## 2019-03-19 RX ORDER — ONDANSETRON 2 MG/ML
4 INJECTION INTRAMUSCULAR; INTRAVENOUS EVERY 6 HOURS PRN
Status: DISCONTINUED | OUTPATIENT
Start: 2019-03-19 | End: 2019-03-22 | Stop reason: SDUPTHER

## 2019-03-19 RX ORDER — FENTANYL CITRATE 50 UG/ML
50 INJECTION, SOLUTION INTRAMUSCULAR; INTRAVENOUS
Status: DISCONTINUED | OUTPATIENT
Start: 2019-03-19 | End: 2019-03-22 | Stop reason: HOSPADM

## 2019-03-19 RX ORDER — SODIUM CHLORIDE 9 MG/ML
INJECTION, SOLUTION INTRAVENOUS CONTINUOUS
Status: DISCONTINUED | OUTPATIENT
Start: 2019-03-19 | End: 2019-03-19

## 2019-03-19 RX ORDER — FENTANYL CITRATE 50 UG/ML
50 INJECTION, SOLUTION INTRAMUSCULAR; INTRAVENOUS ONCE
Status: DISCONTINUED | OUTPATIENT
Start: 2019-03-19 | End: 2019-03-19

## 2019-03-19 RX ORDER — SODIUM CHLORIDE 0.9 % (FLUSH) 0.9 %
10 SYRINGE (ML) INJECTION EVERY 12 HOURS SCHEDULED
Status: DISCONTINUED | OUTPATIENT
Start: 2019-03-19 | End: 2019-03-22 | Stop reason: SDUPTHER

## 2019-03-19 RX ADMIN — PREDNISONE 6 MG: 1 TABLET ORAL at 17:56

## 2019-03-19 RX ADMIN — SODIUM CHLORIDE: 9 INJECTION, SOLUTION INTRAVENOUS at 14:30

## 2019-03-19 RX ADMIN — METOCLOPRAMIDE 10 MG: 5 INJECTION, SOLUTION INTRAMUSCULAR; INTRAVENOUS at 10:51

## 2019-03-19 RX ADMIN — GADOTERIDOL 20 ML: 279.3 INJECTION, SOLUTION INTRAVENOUS at 16:06

## 2019-03-19 RX ADMIN — NORGESTIMATE AND ETHINYL ESTRADIOL 1 TABLET: KIT ORAL at 18:48

## 2019-03-19 RX ADMIN — IBUPROFEN 600 MG: 600 TABLET ORAL at 20:16

## 2019-03-19 RX ADMIN — SODIUM CHLORIDE 1000 ML: 9 INJECTION, SOLUTION INTRAVENOUS at 10:08

## 2019-03-19 RX ADMIN — ONDANSETRON 4 MG: 2 INJECTION INTRAMUSCULAR; INTRAVENOUS at 18:48

## 2019-03-19 RX ADMIN — ONDANSETRON 4 MG: 2 INJECTION INTRAMUSCULAR; INTRAVENOUS at 10:08

## 2019-03-19 RX ADMIN — FENTANYL CITRATE 50 MCG: 50 INJECTION INTRAMUSCULAR; INTRAVENOUS at 10:55

## 2019-03-19 RX ADMIN — FENTANYL CITRATE 25 MCG: 50 INJECTION INTRAMUSCULAR; INTRAVENOUS at 10:08

## 2019-03-19 ASSESSMENT — PAIN DESCRIPTION - ORIENTATION: ORIENTATION: RIGHT;UPPER;MID

## 2019-03-19 ASSESSMENT — PAIN SCALES - GENERAL
PAINLEVEL_OUTOF10: 3
PAINLEVEL_OUTOF10: 3
PAINLEVEL_OUTOF10: 8
PAINLEVEL_OUTOF10: 2
PAINLEVEL_OUTOF10: 6
PAINLEVEL_OUTOF10: 8

## 2019-03-19 ASSESSMENT — ENCOUNTER SYMPTOMS
COUGH: 0
EYE PAIN: 0
VOMITING: 1
NAUSEA: 1
ABDOMINAL PAIN: 1
BACK PAIN: 0
SORE THROAT: 0
RHINORRHEA: 0
EYE DISCHARGE: 0
DIARRHEA: 1
WHEEZING: 0
SHORTNESS OF BREATH: 0

## 2019-03-19 ASSESSMENT — PAIN DESCRIPTION - LOCATION
LOCATION: ABDOMEN

## 2019-03-19 ASSESSMENT — PAIN DESCRIPTION - DESCRIPTORS: DESCRIPTORS: ACHING;CRAMPING

## 2019-03-19 ASSESSMENT — PAIN DESCRIPTION - PROGRESSION
CLINICAL_PROGRESSION: NOT CHANGED
CLINICAL_PROGRESSION: NOT CHANGED

## 2019-03-19 ASSESSMENT — PAIN - FUNCTIONAL ASSESSMENT: PAIN_FUNCTIONAL_ASSESSMENT: ACTIVITIES ARE NOT PREVENTED

## 2019-03-19 ASSESSMENT — PAIN DESCRIPTION - FREQUENCY: FREQUENCY: CONTINUOUS

## 2019-03-19 ASSESSMENT — PAIN DESCRIPTION - PAIN TYPE
TYPE: ACUTE PAIN
TYPE: ACUTE PAIN

## 2019-03-19 ASSESSMENT — PAIN DESCRIPTION - ONSET: ONSET: ON-GOING

## 2019-03-19 ASSESSMENT — PAIN DESCRIPTION - DIRECTION: RADIATING_TOWARDS: SHOULDER BLADES

## 2019-03-20 ENCOUNTER — APPOINTMENT (OUTPATIENT)
Dept: GENERAL RADIOLOGY | Age: 39
DRG: 419 | End: 2019-03-20
Payer: COMMERCIAL

## 2019-03-20 ENCOUNTER — ANESTHESIA (OUTPATIENT)
Dept: ENDOSCOPY | Age: 39
DRG: 419 | End: 2019-03-20
Payer: COMMERCIAL

## 2019-03-20 ENCOUNTER — ANESTHESIA EVENT (OUTPATIENT)
Dept: ENDOSCOPY | Age: 39
DRG: 419 | End: 2019-03-20
Payer: COMMERCIAL

## 2019-03-20 VITALS
OXYGEN SATURATION: 100 % | RESPIRATION RATE: 15 BRPM | SYSTOLIC BLOOD PRESSURE: 102 MMHG | DIASTOLIC BLOOD PRESSURE: 67 MMHG

## 2019-03-20 LAB
ALBUMIN SERPL-MCNC: 2.9 G/DL (ref 3.5–5.1)
ALP BLD-CCNC: 198 U/L (ref 38–126)
ALT SERPL-CCNC: 426 U/L (ref 11–66)
ANION GAP SERPL CALCULATED.3IONS-SCNC: 10 MEQ/L (ref 8–16)
AST SERPL-CCNC: 423 U/L (ref 5–40)
BASOPHILS # BLD: 0.8 %
BASOPHILS ABSOLUTE: 0.1 THOU/MM3 (ref 0–0.1)
BILIRUB SERPL-MCNC: 5.8 MG/DL (ref 0.3–1.2)
BILIRUBIN DIRECT: 4.6 MG/DL (ref 0–0.3)
BUN BLDV-MCNC: 8 MG/DL (ref 7–22)
CALCIUM SERPL-MCNC: 8.4 MG/DL (ref 8.5–10.5)
CHLORIDE BLD-SCNC: 104 MEQ/L (ref 98–111)
CO2: 22 MEQ/L (ref 23–33)
CREAT SERPL-MCNC: 0.3 MG/DL (ref 0.4–1.2)
EOSINOPHIL # BLD: 2.2 %
EOSINOPHILS ABSOLUTE: 0.1 THOU/MM3 (ref 0–0.4)
ERYTHROCYTE [DISTWIDTH] IN BLOOD BY AUTOMATED COUNT: 13.5 % (ref 11.5–14.5)
ERYTHROCYTE [DISTWIDTH] IN BLOOD BY AUTOMATED COUNT: 46.9 FL (ref 35–45)
GFR SERPL CREATININE-BSD FRML MDRD: > 90 ML/MIN/1.73M2
GLUCOSE BLD-MCNC: 94 MG/DL (ref 70–108)
HCT VFR BLD CALC: 40.5 % (ref 37–47)
HEMOGLOBIN: 13.2 GM/DL (ref 12–16)
IMMATURE GRANS (ABS): 0.01 THOU/MM3 (ref 0–0.07)
IMMATURE GRANULOCYTES: 0.2 %
LYMPHOCYTES # BLD: 26.7 %
LYMPHOCYTES ABSOLUTE: 1.7 THOU/MM3 (ref 1–4.8)
MAGNESIUM: 2.1 MG/DL (ref 1.6–2.4)
MCH RBC QN AUTO: 30.6 PG (ref 26–33)
MCHC RBC AUTO-ENTMCNC: 32.6 GM/DL (ref 32.2–35.5)
MCV RBC AUTO: 94 FL (ref 81–99)
MONOCYTES # BLD: 11.3 %
MONOCYTES ABSOLUTE: 0.7 THOU/MM3 (ref 0.4–1.3)
NUCLEATED RED BLOOD CELLS: 0 /100 WBC
PLATELET # BLD: 204 THOU/MM3 (ref 130–400)
PMV BLD AUTO: 12.5 FL (ref 9.4–12.4)
POTASSIUM REFLEX MAGNESIUM: 4.1 MEQ/L (ref 3.5–5.2)
RBC # BLD: 4.31 MILL/MM3 (ref 4.2–5.4)
SEG NEUTROPHILS: 58.8 %
SEGMENTED NEUTROPHILS ABSOLUTE COUNT: 3.7 THOU/MM3 (ref 1.8–7.7)
SODIUM BLD-SCNC: 136 MEQ/L (ref 135–145)
TOTAL PROTEIN: 5.9 G/DL (ref 6.1–8)
WBC # BLD: 6.3 THOU/MM3 (ref 4.8–10.8)

## 2019-03-20 PROCEDURE — 2580000003 HC RX 258: Performed by: INTERNAL MEDICINE

## 2019-03-20 PROCEDURE — 7100000001 HC PACU RECOVERY - ADDTL 15 MIN: Performed by: INTERNAL MEDICINE

## 2019-03-20 PROCEDURE — 83735 ASSAY OF MAGNESIUM: CPT

## 2019-03-20 PROCEDURE — 2720000010 HC SURG SUPPLY STERILE: Performed by: INTERNAL MEDICINE

## 2019-03-20 PROCEDURE — 99253 IP/OBS CNSLTJ NEW/EST LOW 45: CPT | Performed by: SURGERY

## 2019-03-20 PROCEDURE — 3700000001 HC ADD 15 MINUTES (ANESTHESIA): Performed by: INTERNAL MEDICINE

## 2019-03-20 PROCEDURE — 2709999900 HC NON-CHARGEABLE SUPPLY: Performed by: INTERNAL MEDICINE

## 2019-03-20 PROCEDURE — 80076 HEPATIC FUNCTION PANEL: CPT

## 2019-03-20 PROCEDURE — 99231 SBSQ HOSP IP/OBS SF/LOW 25: CPT | Performed by: INTERNAL MEDICINE

## 2019-03-20 PROCEDURE — 6360000002 HC RX W HCPCS: Performed by: INTERNAL MEDICINE

## 2019-03-20 PROCEDURE — 3700000000 HC ANESTHESIA ATTENDED CARE: Performed by: INTERNAL MEDICINE

## 2019-03-20 PROCEDURE — 6370000000 HC RX 637 (ALT 250 FOR IP): Performed by: INTERNAL MEDICINE

## 2019-03-20 PROCEDURE — 85025 COMPLETE CBC W/AUTO DIFF WBC: CPT

## 2019-03-20 PROCEDURE — 7100000000 HC PACU RECOVERY - FIRST 15 MIN: Performed by: INTERNAL MEDICINE

## 2019-03-20 PROCEDURE — 80048 BASIC METABOLIC PNL TOTAL CA: CPT

## 2019-03-20 PROCEDURE — C1769 GUIDE WIRE: HCPCS | Performed by: INTERNAL MEDICINE

## 2019-03-20 PROCEDURE — 71045 X-RAY EXAM CHEST 1 VIEW: CPT

## 2019-03-20 PROCEDURE — 74328 X-RAY BILE DUCT ENDOSCOPY: CPT

## 2019-03-20 PROCEDURE — 6360000002 HC RX W HCPCS

## 2019-03-20 PROCEDURE — 2709999900 HC NON-CHARGEABLE SUPPLY

## 2019-03-20 PROCEDURE — 0F798ZZ DILATION OF COMMON BILE DUCT, VIA NATURAL OR ARTIFICIAL OPENING ENDOSCOPIC: ICD-10-PCS | Performed by: INTERNAL MEDICINE

## 2019-03-20 PROCEDURE — 6370000000 HC RX 637 (ALT 250 FOR IP): Performed by: NURSE PRACTITIONER

## 2019-03-20 PROCEDURE — 36415 COLL VENOUS BLD VENIPUNCTURE: CPT

## 2019-03-20 PROCEDURE — 2500000003 HC RX 250 WO HCPCS: Performed by: NURSE ANESTHETIST, CERTIFIED REGISTERED

## 2019-03-20 PROCEDURE — 3609018800 HC ERCP DX COLLECTION SPECIMEN BRUSHING/WASHING: Performed by: INTERNAL MEDICINE

## 2019-03-20 PROCEDURE — 1200000000 HC SEMI PRIVATE

## 2019-03-20 PROCEDURE — 3609014900 HC ERCP W/SPHINCTEROTOMY &/OR PAPILLOTOMY: Performed by: INTERNAL MEDICINE

## 2019-03-20 PROCEDURE — 6360000002 HC RX W HCPCS: Performed by: NURSE ANESTHETIST, CERTIFIED REGISTERED

## 2019-03-20 RX ORDER — PROPOFOL 10 MG/ML
INJECTION, EMULSION INTRAVENOUS PRN
Status: DISCONTINUED | OUTPATIENT
Start: 2019-03-20 | End: 2019-03-20 | Stop reason: SDUPTHER

## 2019-03-20 RX ORDER — KETAMINE HYDROCHLORIDE 50 MG/ML
INJECTION, SOLUTION, CONCENTRATE INTRAMUSCULAR; INTRAVENOUS PRN
Status: DISCONTINUED | OUTPATIENT
Start: 2019-03-20 | End: 2019-03-20 | Stop reason: SDUPTHER

## 2019-03-20 RX ORDER — SODIUM CHLORIDE, SODIUM LACTATE, POTASSIUM CHLORIDE, CALCIUM CHLORIDE 600; 310; 30; 20 MG/100ML; MG/100ML; MG/100ML; MG/100ML
INJECTION, SOLUTION INTRAVENOUS CONTINUOUS
Status: DISCONTINUED | OUTPATIENT
Start: 2019-03-20 | End: 2019-03-21

## 2019-03-20 RX ORDER — LIDOCAINE HYDROCHLORIDE 20 MG/ML
INJECTION, SOLUTION INFILTRATION; PERINEURAL PRN
Status: DISCONTINUED | OUTPATIENT
Start: 2019-03-20 | End: 2019-03-20 | Stop reason: SDUPTHER

## 2019-03-20 RX ORDER — ESMOLOL HYDROCHLORIDE 10 MG/ML
INJECTION INTRAVENOUS PRN
Status: DISCONTINUED | OUTPATIENT
Start: 2019-03-20 | End: 2019-03-20 | Stop reason: SDUPTHER

## 2019-03-20 RX ORDER — CIPROFLOXACIN 2 MG/ML
400 INJECTION, SOLUTION INTRAVENOUS ONCE
Status: COMPLETED | OUTPATIENT
Start: 2019-03-20 | End: 2019-03-20

## 2019-03-20 RX ORDER — MIDAZOLAM HYDROCHLORIDE 1 MG/ML
INJECTION INTRAMUSCULAR; INTRAVENOUS PRN
Status: DISCONTINUED | OUTPATIENT
Start: 2019-03-20 | End: 2019-03-20 | Stop reason: SDUPTHER

## 2019-03-20 RX ADMIN — KETAMINE HYDROCHLORIDE 10 MG: 50 INJECTION, SOLUTION INTRAMUSCULAR; INTRAVENOUS at 14:18

## 2019-03-20 RX ADMIN — PANTOPRAZOLE SODIUM 40 MG: 40 TABLET, DELAYED RELEASE ORAL at 19:54

## 2019-03-20 RX ADMIN — PREDNISONE 6 MG: 1 TABLET ORAL at 19:54

## 2019-03-20 RX ADMIN — ONDANSETRON 4 MG: 2 INJECTION INTRAMUSCULAR; INTRAVENOUS at 22:48

## 2019-03-20 RX ADMIN — PROPOFOL 40 MG: 10 INJECTION, EMULSION INTRAVENOUS at 14:18

## 2019-03-20 RX ADMIN — KETAMINE HYDROCHLORIDE 8 MG: 50 INJECTION, SOLUTION INTRAMUSCULAR; INTRAVENOUS at 14:21

## 2019-03-20 RX ADMIN — PROPOFOL 80 MG: 10 INJECTION, EMULSION INTRAVENOUS at 14:02

## 2019-03-20 RX ADMIN — SODIUM CHLORIDE, POTASSIUM CHLORIDE, SODIUM LACTATE AND CALCIUM CHLORIDE: 600; 310; 30; 20 INJECTION, SOLUTION INTRAVENOUS at 10:18

## 2019-03-20 RX ADMIN — PROPOFOL 40 MG: 10 INJECTION, EMULSION INTRAVENOUS at 14:21

## 2019-03-20 RX ADMIN — NORGESTIMATE AND ETHINYL ESTRADIOL 1 TABLET: KIT ORAL at 19:54

## 2019-03-20 RX ADMIN — MIDAZOLAM HYDROCHLORIDE 3 MG: 1 INJECTION, SOLUTION INTRAMUSCULAR; INTRAVENOUS at 13:58

## 2019-03-20 RX ADMIN — INDOMETHACIN 100 MG: 50 SUPPOSITORY RECTAL at 14:00

## 2019-03-20 RX ADMIN — PROPOFOL 20 MG: 10 INJECTION, EMULSION INTRAVENOUS at 14:13

## 2019-03-20 RX ADMIN — KETAMINE HYDROCHLORIDE 20 MG: 50 INJECTION, SOLUTION INTRAMUSCULAR; INTRAVENOUS at 14:02

## 2019-03-20 RX ADMIN — CIPROFLOXACIN 400 MG: 2 INJECTION, SOLUTION INTRAVENOUS at 11:23

## 2019-03-20 RX ADMIN — ONDANSETRON 4 MG: 2 INJECTION INTRAMUSCULAR; INTRAVENOUS at 16:23

## 2019-03-20 RX ADMIN — KETAMINE HYDROCHLORIDE 6 MG: 50 INJECTION, SOLUTION INTRAMUSCULAR; INTRAVENOUS at 14:10

## 2019-03-20 RX ADMIN — MIDAZOLAM HYDROCHLORIDE 2 MG: 1 INJECTION, SOLUTION INTRAMUSCULAR; INTRAVENOUS at 14:13

## 2019-03-20 RX ADMIN — LIDOCAINE HYDROCHLORIDE 100 MG: 20 INJECTION, SOLUTION INFILTRATION; PERINEURAL at 13:58

## 2019-03-20 RX ADMIN — PHENYLEPHRINE HYDROCHLORIDE 200 MCG: 10 INJECTION INTRAVENOUS at 14:24

## 2019-03-20 RX ADMIN — ESMOLOL HYDROCHLORIDE 20 MG: 10 INJECTION, SOLUTION INTRAVENOUS at 14:48

## 2019-03-20 RX ADMIN — HYDROCORTISONE SODIUM SUCCINATE 100 MG: 100 INJECTION, POWDER, FOR SOLUTION INTRAMUSCULAR; INTRAVENOUS at 13:58

## 2019-03-20 RX ADMIN — PROPOFOL 20 MG: 10 INJECTION, EMULSION INTRAVENOUS at 14:10

## 2019-03-20 RX ADMIN — PROMETHAZINE HYDROCHLORIDE 25 MG: 25 TABLET ORAL at 19:54

## 2019-03-20 RX ADMIN — SODIUM CHLORIDE, POTASSIUM CHLORIDE, SODIUM LACTATE AND CALCIUM CHLORIDE: 600; 310; 30; 20 INJECTION, SOLUTION INTRAVENOUS at 14:45

## 2019-03-20 RX ADMIN — SODIUM CHLORIDE, POTASSIUM CHLORIDE, SODIUM LACTATE AND CALCIUM CHLORIDE: 600; 310; 30; 20 INJECTION, SOLUTION INTRAVENOUS at 01:32

## 2019-03-20 RX ADMIN — SODIUM CHLORIDE, POTASSIUM CHLORIDE, SODIUM LACTATE AND CALCIUM CHLORIDE: 600; 310; 30; 20 INJECTION, SOLUTION INTRAVENOUS at 22:48

## 2019-03-20 RX ADMIN — ESMOLOL HYDROCHLORIDE 20 MG: 10 INJECTION, SOLUTION INTRAVENOUS at 14:16

## 2019-03-20 RX ADMIN — KETAMINE HYDROCHLORIDE 6 MG: 50 INJECTION, SOLUTION INTRAMUSCULAR; INTRAVENOUS at 14:13

## 2019-03-20 ASSESSMENT — PAIN DESCRIPTION - PROGRESSION
CLINICAL_PROGRESSION: NOT CHANGED

## 2019-03-20 ASSESSMENT — PAIN DESCRIPTION - ONSET
ONSET: ON-GOING

## 2019-03-20 ASSESSMENT — PAIN SCALES - GENERAL
PAINLEVEL_OUTOF10: 0
PAINLEVEL_OUTOF10: 3
PAINLEVEL_OUTOF10: 0
PAINLEVEL_OUTOF10: 0
PAINLEVEL_OUTOF10: 1

## 2019-03-20 ASSESSMENT — PAIN - FUNCTIONAL ASSESSMENT
PAIN_FUNCTIONAL_ASSESSMENT: ACTIVITIES ARE NOT PREVENTED
PAIN_FUNCTIONAL_ASSESSMENT: 0-10
PAIN_FUNCTIONAL_ASSESSMENT: ACTIVITIES ARE NOT PREVENTED

## 2019-03-20 ASSESSMENT — PAIN DESCRIPTION - FREQUENCY
FREQUENCY: CONTINUOUS

## 2019-03-20 ASSESSMENT — PAIN DESCRIPTION - ORIENTATION
ORIENTATION: UPPER;RIGHT;LEFT
ORIENTATION: RIGHT;UPPER

## 2019-03-20 ASSESSMENT — PAIN DESCRIPTION - LOCATION
LOCATION: HEAD
LOCATION: ABDOMEN
LOCATION: ABDOMEN;NECK

## 2019-03-20 ASSESSMENT — PAIN DESCRIPTION - PAIN TYPE
TYPE: ACUTE PAIN

## 2019-03-20 ASSESSMENT — PAIN DESCRIPTION - DESCRIPTORS
DESCRIPTORS: DISCOMFORT;TENDER
DESCRIPTORS: ACHING;CRAMPING
DESCRIPTORS: HEADACHE

## 2019-03-21 ENCOUNTER — ANESTHESIA EVENT (OUTPATIENT)
Dept: OPERATING ROOM | Age: 39
DRG: 419 | End: 2019-03-21
Payer: COMMERCIAL

## 2019-03-21 LAB
ALBUMIN SERPL-MCNC: 2.8 G/DL (ref 3.5–5.1)
ALP BLD-CCNC: 167 U/L (ref 38–126)
ALT SERPL-CCNC: 296 U/L (ref 11–66)
ANION GAP SERPL CALCULATED.3IONS-SCNC: 11 MEQ/L (ref 8–16)
AST SERPL-CCNC: 156 U/L (ref 5–40)
BILIRUB SERPL-MCNC: 1.9 MG/DL (ref 0.3–1.2)
BILIRUBIN DIRECT: 0.8 MG/DL (ref 0–0.3)
BUN BLDV-MCNC: 7 MG/DL (ref 7–22)
CALCIUM SERPL-MCNC: 8.1 MG/DL (ref 8.5–10.5)
CHLORIDE BLD-SCNC: 108 MEQ/L (ref 98–111)
CO2: 24 MEQ/L (ref 23–33)
CREAT SERPL-MCNC: 0.4 MG/DL (ref 0.4–1.2)
ERYTHROCYTE [DISTWIDTH] IN BLOOD BY AUTOMATED COUNT: 13.3 % (ref 11.5–14.5)
ERYTHROCYTE [DISTWIDTH] IN BLOOD BY AUTOMATED COUNT: 42.6 FL (ref 35–45)
GFR SERPL CREATININE-BSD FRML MDRD: > 90 ML/MIN/1.73M2
GLUCOSE BLD-MCNC: 85 MG/DL (ref 70–108)
HCT VFR BLD CALC: 35.1 % (ref 37–47)
HEMOGLOBIN: 12.3 GM/DL (ref 12–16)
LIPASE: 54.1 U/L (ref 5.6–51.3)
MCH RBC QN AUTO: 30.8 PG (ref 26–33)
MCHC RBC AUTO-ENTMCNC: 35 GM/DL (ref 32.2–35.5)
MCV RBC AUTO: 87.8 FL (ref 81–99)
PLATELET # BLD: 148 THOU/MM3 (ref 130–400)
PMV BLD AUTO: 12.5 FL (ref 9.4–12.4)
POTASSIUM SERPL-SCNC: 4.4 MEQ/L (ref 3.5–5.2)
RBC # BLD: 4 MILL/MM3 (ref 4.2–5.4)
SODIUM BLD-SCNC: 143 MEQ/L (ref 135–145)
TOTAL PROTEIN: 5 G/DL (ref 6.1–8)
WBC # BLD: 10.4 THOU/MM3 (ref 4.8–10.8)

## 2019-03-21 PROCEDURE — APPSS45 APP SPLIT SHARED TIME 31-45 MINUTES: Performed by: NURSE PRACTITIONER

## 2019-03-21 PROCEDURE — 6370000000 HC RX 637 (ALT 250 FOR IP): Performed by: NURSE PRACTITIONER

## 2019-03-21 PROCEDURE — 2709999900 HC NON-CHARGEABLE SUPPLY

## 2019-03-21 PROCEDURE — 36415 COLL VENOUS BLD VENIPUNCTURE: CPT

## 2019-03-21 PROCEDURE — 80053 COMPREHEN METABOLIC PANEL: CPT

## 2019-03-21 PROCEDURE — 83690 ASSAY OF LIPASE: CPT

## 2019-03-21 PROCEDURE — 6370000000 HC RX 637 (ALT 250 FOR IP): Performed by: INTERNAL MEDICINE

## 2019-03-21 PROCEDURE — 82248 BILIRUBIN DIRECT: CPT

## 2019-03-21 PROCEDURE — 99232 SBSQ HOSP IP/OBS MODERATE 35: CPT | Performed by: SURGERY

## 2019-03-21 PROCEDURE — 2580000003 HC RX 258: Performed by: INTERNAL MEDICINE

## 2019-03-21 PROCEDURE — 99232 SBSQ HOSP IP/OBS MODERATE 35: CPT | Performed by: INTERNAL MEDICINE

## 2019-03-21 PROCEDURE — 85027 COMPLETE CBC AUTOMATED: CPT

## 2019-03-21 PROCEDURE — 1200000000 HC SEMI PRIVATE

## 2019-03-21 RX ADMIN — NORGESTIMATE AND ETHINYL ESTRADIOL 1 TABLET: KIT ORAL at 21:05

## 2019-03-21 RX ADMIN — PREDNISONE 6 MG: 1 TABLET ORAL at 21:05

## 2019-03-21 RX ADMIN — SODIUM CHLORIDE, POTASSIUM CHLORIDE, SODIUM LACTATE AND CALCIUM CHLORIDE: 600; 310; 30; 20 INJECTION, SOLUTION INTRAVENOUS at 06:03

## 2019-03-21 RX ADMIN — PANTOPRAZOLE SODIUM 40 MG: 40 TABLET, DELAYED RELEASE ORAL at 21:05

## 2019-03-21 RX ADMIN — Medication 3.3 MG: at 13:01

## 2019-03-21 RX ADMIN — SODIUM CHLORIDE, POTASSIUM CHLORIDE, SODIUM LACTATE AND CALCIUM CHLORIDE: 600; 310; 30; 20 INJECTION, SOLUTION INTRAVENOUS at 15:55

## 2019-03-21 ASSESSMENT — PAIN DESCRIPTION - LOCATION
LOCATION: THROAT
LOCATION_2: ABDOMEN
LOCATION: THROAT

## 2019-03-21 ASSESSMENT — PAIN DESCRIPTION - FREQUENCY: FREQUENCY: CONTINUOUS

## 2019-03-21 ASSESSMENT — PAIN DESCRIPTION - PROGRESSION
CLINICAL_PROGRESSION: NOT CHANGED
CLINICAL_PROGRESSION: NOT CHANGED

## 2019-03-21 ASSESSMENT — PAIN SCALES - GENERAL
PAINLEVEL_OUTOF10: 0
PAINLEVEL_OUTOF10: 5
PAINLEVEL_OUTOF10: 4

## 2019-03-21 ASSESSMENT — PAIN DESCRIPTION - DESCRIPTORS
DESCRIPTORS_2: ACHING;DISCOMFORT
DESCRIPTORS: DISCOMFORT

## 2019-03-21 ASSESSMENT — PAIN DESCRIPTION - ONSET
ONSET: ON-GOING
ONSET: ON-GOING

## 2019-03-21 ASSESSMENT — PAIN DESCRIPTION - PAIN TYPE
TYPE: ACUTE PAIN
TYPE: ACUTE PAIN

## 2019-03-21 ASSESSMENT — PAIN DESCRIPTION - INTENSITY: RATING_2: 2

## 2019-03-21 ASSESSMENT — PAIN - FUNCTIONAL ASSESSMENT: PAIN_FUNCTIONAL_ASSESSMENT: ACTIVITIES ARE NOT PREVENTED

## 2019-03-22 ENCOUNTER — ANESTHESIA (OUTPATIENT)
Dept: OPERATING ROOM | Age: 39
DRG: 419 | End: 2019-03-22
Payer: COMMERCIAL

## 2019-03-22 VITALS
TEMPERATURE: 98.1 F | WEIGHT: 181 LBS | HEART RATE: 64 BPM | RESPIRATION RATE: 18 BRPM | OXYGEN SATURATION: 97 % | HEIGHT: 68 IN | BODY MASS INDEX: 27.43 KG/M2 | DIASTOLIC BLOOD PRESSURE: 80 MMHG | SYSTOLIC BLOOD PRESSURE: 121 MMHG

## 2019-03-22 VITALS
DIASTOLIC BLOOD PRESSURE: 64 MMHG | OXYGEN SATURATION: 100 % | RESPIRATION RATE: 4 BRPM | SYSTOLIC BLOOD PRESSURE: 109 MMHG | TEMPERATURE: 97.7 F

## 2019-03-22 LAB
ALBUMIN SERPL-MCNC: 3.1 G/DL (ref 3.5–5.1)
ALP BLD-CCNC: 166 U/L (ref 38–126)
ALT SERPL-CCNC: 242 U/L (ref 11–66)
ANION GAP SERPL CALCULATED.3IONS-SCNC: 9 MEQ/L (ref 8–16)
AST SERPL-CCNC: 80 U/L (ref 5–40)
BILIRUB SERPL-MCNC: 1.1 MG/DL (ref 0.3–1.2)
BILIRUBIN DIRECT: 0.5 MG/DL (ref 0–0.3)
BUN BLDV-MCNC: 6 MG/DL (ref 7–22)
CALCIUM SERPL-MCNC: 8.6 MG/DL (ref 8.5–10.5)
CHLORIDE BLD-SCNC: 101 MEQ/L (ref 98–111)
CO2: 29 MEQ/L (ref 23–33)
CREAT SERPL-MCNC: 0.5 MG/DL (ref 0.4–1.2)
ERYTHROCYTE [DISTWIDTH] IN BLOOD BY AUTOMATED COUNT: 13.7 % (ref 11.5–14.5)
ERYTHROCYTE [DISTWIDTH] IN BLOOD BY AUTOMATED COUNT: 47.1 FL (ref 35–45)
GFR SERPL CREATININE-BSD FRML MDRD: > 90 ML/MIN/1.73M2
GLUCOSE BLD-MCNC: 102 MG/DL (ref 70–108)
HCT VFR BLD CALC: 39.1 % (ref 37–47)
HEMOGLOBIN: 12.6 GM/DL (ref 12–16)
LIPASE: 39.2 U/L (ref 5.6–51.3)
MCH RBC QN AUTO: 30.5 PG (ref 26–33)
MCHC RBC AUTO-ENTMCNC: 32.2 GM/DL (ref 32.2–35.5)
MCV RBC AUTO: 94.7 FL (ref 81–99)
PLATELET # BLD: 206 THOU/MM3 (ref 130–400)
PMV BLD AUTO: 12.4 FL (ref 9.4–12.4)
POTASSIUM SERPL-SCNC: 5 MEQ/L (ref 3.5–5.2)
RBC # BLD: 4.13 MILL/MM3 (ref 4.2–5.4)
SODIUM BLD-SCNC: 139 MEQ/L (ref 135–145)
TOTAL PROTEIN: 6.1 G/DL (ref 6.1–8)
WBC # BLD: 7.6 THOU/MM3 (ref 4.8–10.8)

## 2019-03-22 PROCEDURE — 8E0W4CZ ROBOTIC ASSISTED PROCEDURE OF TRUNK REGION, PERCUTANEOUS ENDOSCOPIC APPROACH: ICD-10-PCS | Performed by: SURGERY

## 2019-03-22 PROCEDURE — 2709999900 HC NON-CHARGEABLE SUPPLY: Performed by: SURGERY

## 2019-03-22 PROCEDURE — 6360000002 HC RX W HCPCS

## 2019-03-22 PROCEDURE — 36415 COLL VENOUS BLD VENIPUNCTURE: CPT

## 2019-03-22 PROCEDURE — 6360000002 HC RX W HCPCS: Performed by: NURSE PRACTITIONER

## 2019-03-22 PROCEDURE — 85027 COMPLETE CBC AUTOMATED: CPT

## 2019-03-22 PROCEDURE — 47562 LAPAROSCOPIC CHOLECYSTECTOMY: CPT | Performed by: SURGERY

## 2019-03-22 PROCEDURE — 88304 TISSUE EXAM BY PATHOLOGIST: CPT

## 2019-03-22 PROCEDURE — 2709999900 HC NON-CHARGEABLE SUPPLY

## 2019-03-22 PROCEDURE — 7100000000 HC PACU RECOVERY - FIRST 15 MIN: Performed by: SURGERY

## 2019-03-22 PROCEDURE — 2580000003 HC RX 258: Performed by: SURGERY

## 2019-03-22 PROCEDURE — 7100000001 HC PACU RECOVERY - ADDTL 15 MIN: Performed by: SURGERY

## 2019-03-22 PROCEDURE — 6360000002 HC RX W HCPCS: Performed by: ANESTHESIOLOGY

## 2019-03-22 PROCEDURE — 2580000003 HC RX 258: Performed by: NURSE ANESTHETIST, CERTIFIED REGISTERED

## 2019-03-22 PROCEDURE — 6360000002 HC RX W HCPCS: Performed by: NURSE ANESTHETIST, CERTIFIED REGISTERED

## 2019-03-22 PROCEDURE — 3600000019 HC SURGERY ROBOT ADDTL 15MIN: Performed by: SURGERY

## 2019-03-22 PROCEDURE — S2900 ROBOTIC SURGICAL SYSTEM: HCPCS | Performed by: SURGERY

## 2019-03-22 PROCEDURE — 2580000003 HC RX 258: Performed by: NURSE PRACTITIONER

## 2019-03-22 PROCEDURE — 6370000000 HC RX 637 (ALT 250 FOR IP): Performed by: SURGERY

## 2019-03-22 PROCEDURE — 2500000003 HC RX 250 WO HCPCS: Performed by: SURGERY

## 2019-03-22 PROCEDURE — 6360000002 HC RX W HCPCS: Performed by: INTERNAL MEDICINE

## 2019-03-22 PROCEDURE — 0FT44ZZ RESECTION OF GALLBLADDER, PERCUTANEOUS ENDOSCOPIC APPROACH: ICD-10-PCS | Performed by: SURGERY

## 2019-03-22 PROCEDURE — 2580000003 HC RX 258: Performed by: INTERNAL MEDICINE

## 2019-03-22 PROCEDURE — 2780000010 HC IMPLANT OTHER: Performed by: SURGERY

## 2019-03-22 PROCEDURE — 82248 BILIRUBIN DIRECT: CPT

## 2019-03-22 PROCEDURE — 99231 SBSQ HOSP IP/OBS SF/LOW 25: CPT | Performed by: SURGERY

## 2019-03-22 PROCEDURE — 99239 HOSP IP/OBS DSCHRG MGMT >30: CPT | Performed by: INTERNAL MEDICINE

## 2019-03-22 PROCEDURE — 80053 COMPREHEN METABOLIC PANEL: CPT

## 2019-03-22 PROCEDURE — 83690 ASSAY OF LIPASE: CPT

## 2019-03-22 PROCEDURE — 3700000000 HC ANESTHESIA ATTENDED CARE: Performed by: SURGERY

## 2019-03-22 PROCEDURE — 3600000009 HC SURGERY ROBOT BASE: Performed by: SURGERY

## 2019-03-22 PROCEDURE — 2500000003 HC RX 250 WO HCPCS: Performed by: NURSE ANESTHETIST, CERTIFIED REGISTERED

## 2019-03-22 PROCEDURE — 3700000001 HC ADD 15 MINUTES (ANESTHESIA): Performed by: SURGERY

## 2019-03-22 RX ORDER — BUPIVACAINE HYDROCHLORIDE AND EPINEPHRINE 5; 5 MG/ML; UG/ML
INJECTION, SOLUTION EPIDURAL; INTRACAUDAL; PERINEURAL PRN
Status: DISCONTINUED | OUTPATIENT
Start: 2019-03-22 | End: 2019-03-22 | Stop reason: ALTCHOICE

## 2019-03-22 RX ORDER — LABETALOL HYDROCHLORIDE 5 MG/ML
5 INJECTION, SOLUTION INTRAVENOUS EVERY 10 MIN PRN
Status: DISCONTINUED | OUTPATIENT
Start: 2019-03-22 | End: 2019-03-22 | Stop reason: HOSPADM

## 2019-03-22 RX ORDER — NEOSTIGMINE METHYLSULFATE 5 MG/5 ML
SYRINGE (ML) INTRAVENOUS PRN
Status: DISCONTINUED | OUTPATIENT
Start: 2019-03-22 | End: 2019-03-22 | Stop reason: SDUPTHER

## 2019-03-22 RX ORDER — PROMETHAZINE HYDROCHLORIDE 25 MG/ML
12.5 INJECTION, SOLUTION INTRAMUSCULAR; INTRAVENOUS ONCE
Status: COMPLETED | OUTPATIENT
Start: 2019-03-22 | End: 2019-03-22

## 2019-03-22 RX ORDER — SODIUM CHLORIDE 0.9 % (FLUSH) 0.9 %
10 SYRINGE (ML) INJECTION EVERY 12 HOURS SCHEDULED
Status: DISCONTINUED | OUTPATIENT
Start: 2019-03-22 | End: 2019-03-22 | Stop reason: HOSPADM

## 2019-03-22 RX ORDER — ONDANSETRON 2 MG/ML
INJECTION INTRAMUSCULAR; INTRAVENOUS PRN
Status: DISCONTINUED | OUTPATIENT
Start: 2019-03-22 | End: 2019-03-22 | Stop reason: SDUPTHER

## 2019-03-22 RX ORDER — HYDROCODONE BITARTRATE AND ACETAMINOPHEN 5; 325 MG/1; MG/1
1 TABLET ORAL EVERY 4 HOURS PRN
Status: DISCONTINUED | OUTPATIENT
Start: 2019-03-22 | End: 2019-03-22 | Stop reason: HOSPADM

## 2019-03-22 RX ORDER — MEPERIDINE HYDROCHLORIDE 25 MG/ML
12.5 INJECTION INTRAMUSCULAR; INTRAVENOUS; SUBCUTANEOUS EVERY 5 MIN PRN
Status: DISCONTINUED | OUTPATIENT
Start: 2019-03-22 | End: 2019-03-22 | Stop reason: HOSPADM

## 2019-03-22 RX ORDER — MORPHINE SULFATE 2 MG/ML
2 INJECTION, SOLUTION INTRAMUSCULAR; INTRAVENOUS
Status: DISCONTINUED | OUTPATIENT
Start: 2019-03-22 | End: 2019-03-22 | Stop reason: HOSPADM

## 2019-03-22 RX ORDER — INDOCYANINE GREEN AND WATER 25 MG
KIT INJECTION PRN
Status: DISCONTINUED | OUTPATIENT
Start: 2019-03-22 | End: 2019-03-22 | Stop reason: SDUPTHER

## 2019-03-22 RX ORDER — KETOROLAC TROMETHAMINE 10 MG/1
10 TABLET, FILM COATED ORAL EVERY 8 HOURS PRN
Qty: 20 TABLET | Refills: 0 | Status: SHIPPED | OUTPATIENT
Start: 2019-03-22 | End: 2019-04-03 | Stop reason: ALTCHOICE

## 2019-03-22 RX ORDER — MIDAZOLAM HYDROCHLORIDE 1 MG/ML
INJECTION INTRAMUSCULAR; INTRAVENOUS PRN
Status: DISCONTINUED | OUTPATIENT
Start: 2019-03-22 | End: 2019-03-22 | Stop reason: SDUPTHER

## 2019-03-22 RX ORDER — SODIUM CHLORIDE 0.9 % (FLUSH) 0.9 %
10 SYRINGE (ML) INJECTION PRN
Status: DISCONTINUED | OUTPATIENT
Start: 2019-03-22 | End: 2019-03-22 | Stop reason: HOSPADM

## 2019-03-22 RX ORDER — FENTANYL CITRATE 50 UG/ML
INJECTION, SOLUTION INTRAMUSCULAR; INTRAVENOUS PRN
Status: DISCONTINUED | OUTPATIENT
Start: 2019-03-22 | End: 2019-03-22 | Stop reason: SDUPTHER

## 2019-03-22 RX ORDER — HYDROCODONE BITARTRATE AND ACETAMINOPHEN 5; 325 MG/1; MG/1
1 TABLET ORAL EVERY 6 HOURS PRN
Qty: 21 TABLET | Refills: 0 | Status: SHIPPED | OUTPATIENT
Start: 2019-03-22 | End: 2019-03-29

## 2019-03-22 RX ORDER — ROCURONIUM BROMIDE 10 MG/ML
INJECTION, SOLUTION INTRAVENOUS PRN
Status: DISCONTINUED | OUTPATIENT
Start: 2019-03-22 | End: 2019-03-22 | Stop reason: SDUPTHER

## 2019-03-22 RX ORDER — SODIUM CHLORIDE 9 MG/ML
INJECTION, SOLUTION INTRAVENOUS CONTINUOUS PRN
Status: DISCONTINUED | OUTPATIENT
Start: 2019-03-22 | End: 2019-03-22 | Stop reason: SDUPTHER

## 2019-03-22 RX ORDER — ONDANSETRON 2 MG/ML
4 INJECTION INTRAMUSCULAR; INTRAVENOUS EVERY 6 HOURS PRN
Status: DISCONTINUED | OUTPATIENT
Start: 2019-03-22 | End: 2019-03-22 | Stop reason: HOSPADM

## 2019-03-22 RX ORDER — PROMETHAZINE HYDROCHLORIDE 25 MG/ML
INJECTION, SOLUTION INTRAMUSCULAR; INTRAVENOUS
Status: COMPLETED
Start: 2019-03-22 | End: 2019-03-22

## 2019-03-22 RX ORDER — GLYCOPYRROLATE 1 MG/5 ML
SYRINGE (ML) INTRAVENOUS PRN
Status: DISCONTINUED | OUTPATIENT
Start: 2019-03-22 | End: 2019-03-22 | Stop reason: SDUPTHER

## 2019-03-22 RX ORDER — PROPOFOL 10 MG/ML
INJECTION, EMULSION INTRAVENOUS PRN
Status: DISCONTINUED | OUTPATIENT
Start: 2019-03-22 | End: 2019-03-22 | Stop reason: SDUPTHER

## 2019-03-22 RX ORDER — FENTANYL CITRATE 50 UG/ML
50 INJECTION, SOLUTION INTRAMUSCULAR; INTRAVENOUS EVERY 5 MIN PRN
Status: DISCONTINUED | OUTPATIENT
Start: 2019-03-22 | End: 2019-03-22 | Stop reason: HOSPADM

## 2019-03-22 RX ORDER — MORPHINE SULFATE 4 MG/ML
4 INJECTION, SOLUTION INTRAMUSCULAR; INTRAVENOUS
Status: DISCONTINUED | OUTPATIENT
Start: 2019-03-22 | End: 2019-03-22 | Stop reason: HOSPADM

## 2019-03-22 RX ORDER — HYDROCODONE BITARTRATE AND ACETAMINOPHEN 5; 325 MG/1; MG/1
2 TABLET ORAL EVERY 4 HOURS PRN
Status: DISCONTINUED | OUTPATIENT
Start: 2019-03-22 | End: 2019-03-22 | Stop reason: HOSPADM

## 2019-03-22 RX ORDER — DEXAMETHASONE SODIUM PHOSPHATE 4 MG/ML
INJECTION, SOLUTION INTRA-ARTICULAR; INTRALESIONAL; INTRAMUSCULAR; INTRAVENOUS; SOFT TISSUE PRN
Status: DISCONTINUED | OUTPATIENT
Start: 2019-03-22 | End: 2019-03-22 | Stop reason: SDUPTHER

## 2019-03-22 RX ORDER — SODIUM CHLORIDE 9 MG/ML
INJECTION, SOLUTION INTRAVENOUS CONTINUOUS
Status: DISCONTINUED | OUTPATIENT
Start: 2019-03-22 | End: 2019-03-22

## 2019-03-22 RX ORDER — HYDROCODONE BITARTRATE AND ACETAMINOPHEN 5; 325 MG/1; MG/1
1 TABLET ORAL EVERY 4 HOURS PRN
Qty: 15 TABLET | Refills: 0 | Status: SHIPPED | OUTPATIENT
Start: 2019-03-22 | End: 2019-03-22

## 2019-03-22 RX ORDER — AMOXICILLIN 250 MG
2 CAPSULE ORAL DAILY PRN
Qty: 30 TABLET | Refills: 0 | Status: SHIPPED | OUTPATIENT
Start: 2019-03-22 | End: 2019-09-24

## 2019-03-22 RX ADMIN — HYDROCORTISONE SODIUM SUCCINATE 100 MG: 100 INJECTION, POWDER, FOR SOLUTION INTRAMUSCULAR; INTRAVENOUS at 07:48

## 2019-03-22 RX ADMIN — HYDROCODONE BITARTRATE AND ACETAMINOPHEN 1 TABLET: 5; 325 TABLET ORAL at 15:32

## 2019-03-22 RX ADMIN — INDOCYANINE GREEN AND WATER 5 MG: KIT at 07:49

## 2019-03-22 RX ADMIN — FENTANYL CITRATE 150 MCG: 50 INJECTION INTRAMUSCULAR; INTRAVENOUS at 07:36

## 2019-03-22 RX ADMIN — FENTANYL CITRATE 50 MCG: 50 INJECTION INTRAMUSCULAR; INTRAVENOUS at 08:34

## 2019-03-22 RX ADMIN — PROMETHAZINE HYDROCHLORIDE 12.5 MG: 25 INJECTION, SOLUTION INTRAMUSCULAR; INTRAVENOUS at 08:43

## 2019-03-22 RX ADMIN — PIPERACILLIN SODIUM,TAZOBACTAM SODIUM 3.38 G: 3; .375 INJECTION, POWDER, FOR SOLUTION INTRAVENOUS at 07:48

## 2019-03-22 RX ADMIN — ONDANSETRON HYDROCHLORIDE 4 MG: 4 INJECTION, SOLUTION INTRAMUSCULAR; INTRAVENOUS at 08:18

## 2019-03-22 RX ADMIN — DEXAMETHASONE SODIUM PHOSPHATE 8 MG: 4 INJECTION, SOLUTION INTRAMUSCULAR; INTRAVENOUS at 07:45

## 2019-03-22 RX ADMIN — HYDROMORPHONE HYDROCHLORIDE 0.25 MG: 1 INJECTION, SOLUTION INTRAMUSCULAR; INTRAVENOUS; SUBCUTANEOUS at 08:44

## 2019-03-22 RX ADMIN — HYDROMORPHONE HYDROCHLORIDE 0.25 MG: 1 INJECTION, SOLUTION INTRAMUSCULAR; INTRAVENOUS; SUBCUTANEOUS at 08:49

## 2019-03-22 RX ADMIN — FENTANYL CITRATE 50 MCG: 50 INJECTION, SOLUTION INTRAMUSCULAR; INTRAVENOUS at 08:59

## 2019-03-22 RX ADMIN — ROCURONIUM BROMIDE 40 MG: 10 INJECTION INTRAVENOUS at 07:36

## 2019-03-22 RX ADMIN — SODIUM CHLORIDE: 9 INJECTION, SOLUTION INTRAVENOUS at 09:10

## 2019-03-22 RX ADMIN — FENTANYL CITRATE 100 MCG: 50 INJECTION INTRAMUSCULAR; INTRAVENOUS at 07:55

## 2019-03-22 RX ADMIN — HYDROMORPHONE HYDROCHLORIDE 0.25 MG: 1 INJECTION, SOLUTION INTRAMUSCULAR; INTRAVENOUS; SUBCUTANEOUS at 09:00

## 2019-03-22 RX ADMIN — LIDOCAINE HYDROCHLORIDE 100 MG: 20 INJECTION, SOLUTION INTRAVENOUS at 07:36

## 2019-03-22 RX ADMIN — HYDROCODONE BITARTRATE AND ACETAMINOPHEN 1 TABLET: 5; 325 TABLET ORAL at 11:21

## 2019-03-22 RX ADMIN — FENTANYL CITRATE 50 MCG: 50 INJECTION, SOLUTION INTRAMUSCULAR; INTRAVENOUS at 09:05

## 2019-03-22 RX ADMIN — FENTANYL CITRATE 50 MCG: 50 INJECTION INTRAMUSCULAR; INTRAVENOUS at 08:29

## 2019-03-22 RX ADMIN — SODIUM CHLORIDE: 9 INJECTION, SOLUTION INTRAVENOUS at 08:12

## 2019-03-22 RX ADMIN — MIDAZOLAM HYDROCHLORIDE 2 MG: 1 INJECTION, SOLUTION INTRAMUSCULAR; INTRAVENOUS at 07:30

## 2019-03-22 RX ADMIN — HYDROMORPHONE HYDROCHLORIDE 0.25 MG: 1 INJECTION, SOLUTION INTRAMUSCULAR; INTRAVENOUS; SUBCUTANEOUS at 08:54

## 2019-03-22 RX ADMIN — PROMETHAZINE HYDROCHLORIDE 12.5 MG: 25 INJECTION INTRAMUSCULAR; INTRAVENOUS at 08:43

## 2019-03-22 RX ADMIN — SODIUM CHLORIDE, POTASSIUM CHLORIDE, SODIUM LACTATE AND CALCIUM CHLORIDE: 600; 310; 30; 20 INJECTION, SOLUTION INTRAVENOUS at 03:00

## 2019-03-22 RX ADMIN — PROPOFOL 120 MG: 10 INJECTION, EMULSION INTRAVENOUS at 07:36

## 2019-03-22 RX ADMIN — Medication 4 MG: at 08:19

## 2019-03-22 RX ADMIN — Medication 0.8 MG: at 08:19

## 2019-03-22 ASSESSMENT — PAIN SCALES - GENERAL
PAINLEVEL_OUTOF10: 8
PAINLEVEL_OUTOF10: 4
PAINLEVEL_OUTOF10: 3
PAINLEVEL_OUTOF10: 6
PAINLEVEL_OUTOF10: 5
PAINLEVEL_OUTOF10: 6
PAINLEVEL_OUTOF10: 8
PAINLEVEL_OUTOF10: 4
PAINLEVEL_OUTOF10: 7
PAINLEVEL_OUTOF10: 6
PAINLEVEL_OUTOF10: 4
PAINLEVEL_OUTOF10: 4
PAINLEVEL_OUTOF10: 5
PAINLEVEL_OUTOF10: 5

## 2019-03-22 ASSESSMENT — PULMONARY FUNCTION TESTS
PIF_VALUE: 21
PIF_VALUE: 21
PIF_VALUE: 26
PIF_VALUE: 25
PIF_VALUE: 25
PIF_VALUE: 17
PIF_VALUE: 20
PIF_VALUE: 25
PIF_VALUE: 5
PIF_VALUE: 25
PIF_VALUE: 22
PIF_VALUE: 1
PIF_VALUE: 20
PIF_VALUE: 24
PIF_VALUE: 0
PIF_VALUE: 3
PIF_VALUE: 25
PIF_VALUE: 25
PIF_VALUE: 26
PIF_VALUE: 17
PIF_VALUE: 3
PIF_VALUE: 25
PIF_VALUE: 2
PIF_VALUE: 23
PIF_VALUE: 1
PIF_VALUE: 20
PIF_VALUE: 21
PIF_VALUE: 22
PIF_VALUE: 20
PIF_VALUE: 22
PIF_VALUE: 17
PIF_VALUE: 1
PIF_VALUE: 0
PIF_VALUE: 25
PIF_VALUE: 1
PIF_VALUE: 21
PIF_VALUE: 25
PIF_VALUE: 8
PIF_VALUE: 20
PIF_VALUE: 24
PIF_VALUE: 21
PIF_VALUE: 19
PIF_VALUE: 25
PIF_VALUE: 25
PIF_VALUE: 21
PIF_VALUE: 22
PIF_VALUE: 24
PIF_VALUE: 21
PIF_VALUE: 25
PIF_VALUE: 20
PIF_VALUE: 25
PIF_VALUE: 21
PIF_VALUE: 4
PIF_VALUE: 18
PIF_VALUE: 21
PIF_VALUE: 25
PIF_VALUE: 21
PIF_VALUE: 20

## 2019-03-22 ASSESSMENT — PAIN DESCRIPTION - LOCATION
LOCATION: ABDOMEN

## 2019-03-22 ASSESSMENT — PAIN DESCRIPTION - ORIENTATION
ORIENTATION: MID;UPPER

## 2019-03-22 ASSESSMENT — PAIN DESCRIPTION - PAIN TYPE
TYPE: SURGICAL PAIN

## 2019-03-22 ASSESSMENT — PAIN DESCRIPTION - FREQUENCY
FREQUENCY: INTERMITTENT
FREQUENCY: CONTINUOUS
FREQUENCY: INTERMITTENT
FREQUENCY: CONTINUOUS

## 2019-03-25 LAB
BLOOD CULTURE, ROUTINE: NORMAL
BLOOD CULTURE, ROUTINE: NORMAL

## 2019-03-26 ENCOUNTER — TELEPHONE (OUTPATIENT)
Dept: FAMILY MEDICINE CLINIC | Age: 39
End: 2019-03-26

## 2019-04-03 ENCOUNTER — OFFICE VISIT (OUTPATIENT)
Dept: SURGERY | Age: 39
End: 2019-04-03

## 2019-04-03 VITALS
RESPIRATION RATE: 18 BRPM | SYSTOLIC BLOOD PRESSURE: 120 MMHG | OXYGEN SATURATION: 99 % | TEMPERATURE: 96.4 F | BODY MASS INDEX: 28.11 KG/M2 | HEIGHT: 68 IN | WEIGHT: 185.5 LBS | DIASTOLIC BLOOD PRESSURE: 80 MMHG | HEART RATE: 72 BPM

## 2019-04-03 DIAGNOSIS — Z90.49 S/P LAPAROSCOPIC CHOLECYSTECTOMY: Primary | ICD-10-CM

## 2019-04-03 PROBLEM — K80.43 CHOLEDOCHOLITHIASIS WITH ACUTE CHOLECYSTITIS WITH OBSTRUCTION: Status: RESOLVED | Noted: 2019-03-19 | Resolved: 2019-04-03

## 2019-04-03 PROCEDURE — 99024 POSTOP FOLLOW-UP VISIT: CPT | Performed by: NURSE PRACTITIONER

## 2019-04-03 ASSESSMENT — ENCOUNTER SYMPTOMS
COLOR CHANGE: 0
VOICE CHANGE: 0
PHOTOPHOBIA: 0
EYE DISCHARGE: 0
ABDOMINAL DISTENTION: 0
EYE ITCHING: 0
ANAL BLEEDING: 0
TROUBLE SWALLOWING: 0
SHORTNESS OF BREATH: 0
CHEST TIGHTNESS: 0
WHEEZING: 0
STRIDOR: 0
CHOKING: 0
NAUSEA: 0
COUGH: 0
RECTAL PAIN: 0
BACK PAIN: 0
CONSTIPATION: 0
APNEA: 0
FACIAL SWELLING: 0
BLOOD IN STOOL: 0
EYE REDNESS: 0
DIARRHEA: 0
RHINORRHEA: 0
ABDOMINAL PAIN: 0
VOMITING: 0
SORE THROAT: 0
SINUS PRESSURE: 0
EYE PAIN: 0
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
## 2019-04-03 NOTE — LETTER
[address] [facility]  Surgery  [place] [id number]  Phone: [phone]  Fax: [phone]    RENTETA Silva CNP        April 3, 2019     Patient: Olegario Roberts   YOB: 1980   Date of Visit: 4/3/2019       To Whom It May Concern:     John Aggarwal was seen today. She may return on 4/8/19 without restrictions. If you have any questions or concerns, please don't hesitate to call.     Sincerely,        RENETTA Silva CNP

## 2019-04-09 ENCOUNTER — OFFICE VISIT (OUTPATIENT)
Dept: RHEUMATOLOGY | Age: 39
End: 2019-04-09
Payer: COMMERCIAL

## 2019-04-09 VITALS
WEIGHT: 186 LBS | HEIGHT: 68 IN | OXYGEN SATURATION: 100 % | HEART RATE: 75 BPM | DIASTOLIC BLOOD PRESSURE: 74 MMHG | SYSTOLIC BLOOD PRESSURE: 110 MMHG | BODY MASS INDEX: 28.19 KG/M2

## 2019-04-09 DIAGNOSIS — D72.18 CHURG-STRAUSS SYNDROME (HCC): Primary | ICD-10-CM

## 2019-04-09 DIAGNOSIS — M30.1 CHURG-STRAUSS SYNDROME (HCC): Primary | ICD-10-CM

## 2019-04-09 DIAGNOSIS — Z79.52 CURRENT CHRONIC USE OF SYSTEMIC STEROIDS: ICD-10-CM

## 2019-04-09 PROCEDURE — 1111F DSCHRG MED/CURRENT MED MERGE: CPT | Performed by: INTERNAL MEDICINE

## 2019-04-09 PROCEDURE — 99214 OFFICE O/P EST MOD 30 MIN: CPT | Performed by: INTERNAL MEDICINE

## 2019-04-09 PROCEDURE — 1036F TOBACCO NON-USER: CPT | Performed by: INTERNAL MEDICINE

## 2019-04-09 PROCEDURE — G8427 DOCREV CUR MEDS BY ELIG CLIN: HCPCS | Performed by: INTERNAL MEDICINE

## 2019-04-09 PROCEDURE — G8417 CALC BMI ABV UP PARAM F/U: HCPCS | Performed by: INTERNAL MEDICINE

## 2019-04-09 RX ORDER — PREDNISONE 1 MG/1
20 TABLET ORAL DAILY
Qty: 120 TABLET | Refills: 1 | Status: SHIPPED | OUTPATIENT
Start: 2019-04-09 | End: 2019-10-01 | Stop reason: SDUPTHER

## 2019-04-09 RX ORDER — PREDNISONE 1 MG/1
3 TABLET ORAL DAILY
Qty: 90 TABLET | Refills: 1 | Status: SHIPPED | OUTPATIENT
Start: 2019-04-09 | End: 2019-10-01 | Stop reason: SDUPTHER

## 2019-04-09 RX ORDER — ACETAMINOPHEN 500 MG
500 TABLET ORAL EVERY 6 HOURS PRN
COMMUNITY
End: 2019-09-24

## 2019-04-09 ASSESSMENT — ENCOUNTER SYMPTOMS
COUGH: 0
PHOTOPHOBIA: 0
HEMOPTYSIS: 0
SINUS PAIN: 0
BACK PAIN: 0
HEARTBURN: 1
WHEEZING: 0
DOUBLE VISION: 0
EYE DISCHARGE: 0
BLURRED VISION: 0
SHORTNESS OF BREATH: 0
EYE PAIN: 0
ORTHOPNEA: 0

## 2019-04-09 NOTE — PROGRESS NOTES
[facility]  Rheumatology Adult Follow up Note         4/9/2019  MRN: [id number]    o   HPI:   Pia Rdz  is a(n)38 y.o. female with a hx of churg-Connie syndrome, Chronic sinusitis, GERD, Mild intermittent Asthma, allergic rhinitis referred by Dr. Leroy Ontiveros for evaluation of her Churg-Connie. Per pt and chart review she had a long standing h/o allergies and asthma, that worsened around 2002. In 2006 she began having progressive sxs including sinonasal symptoms,  Surgery in 10/2007 with septal deviation and polyp removal. Severe recurrent asthma, increasing eosinophilia up to 55%, severe arthralgias w/o swelling, fatigue, numbness in bilateral feet, and skin rash (legs and forearms) with bx revealing pervsacular dermatitis with prominent eosinophils. Stablized with prednisone during 2008 pregnancy. Started seeing Dr. Arleen Betancur in 2008 and diagnosed with Latoya Agnieszka (EGPA). She had Recurrence of rash in 2009 on b/l lower extremities that resolved with prednisone but returned with tapering. Initialy evaluated at [facility] 8/18/09 at which time Azathioprine was discussed which she started on 9/09. Eddie [place] Sxs improved and prednisone was taper w/o recurrence of rash. She wanted to pursue another child so the imuran was tapered and discontinued on 2/12. She conceived and her pregnancy went well and was far less complicated than her first pregnancy. Daughter deliver on 12/12. Increased sinonasal sxs and asthmatic features with peripheral einophila increased around her 7/13 evaluation. Imuran restarted. In 3/2016 Mepolizumab 100mg/victor hugo was started and her asthma significantly improved, w/ improvement of the PFT, Exertional SOB. Pt weaned off Imuran on 9/16. Prednisone down to 7mg daily 1/17. continued recurrent sinusitis from nasal polyps but her asthma and vasculitis were controlled. SEEING Allergist and ENT currently.       -  Prednisone down to 5 mg daily  -  S/P CHOLECYSTECTOMY 2 WK'S AGO. Denies recurrent of the rash on the arms. No significant sinus congestion,  \"red tinge\" nasal discharged. PND. SOB, VERA, cough, leg swelling, orthopenia, PND, Wheezing, joint pains/arthralgia, urinary changes, hematuria, foamy urination, paresthesia, chest pain, chest tightness. Current therapy:   - Levocetirizine 5mg   - Mepolizumab 300mg  (sept 2018 from 100mg qd since 1613-1256)  - Prednisone 5mg daily   - Pulmicort 0.5mg/2ml  - Bactrim DS M/W/F -- Dr. Virgie Billings screening: up to date   Travel:denies   Exposure(s): denies     ROS:  Review of Systems   Constitutional: Negative for malaise/fatigue and weight loss. HENT: Positive for hearing loss (b/c of sinus issues ). Negative for congestion and sinus pain. Eyes: Negative for blurred vision, double vision, photophobia, pain and discharge. Respiratory: Negative for cough, hemoptysis, shortness of breath and wheezing. Cardiovascular: Negative. Negative for chest pain, orthopnea and leg swelling. Gastrointestinal: Positive for heartburn. Genitourinary: Negative. Negative for frequency, hematuria and urgency. Musculoskeletal: Negative for back pain, joint pain, myalgias and neck pain. Skin: Negative for itching and rash. Neurological: Positive for headaches (intermittent wtih sinus issues). Negative for dizziness, tingling and weakness. Endo/Heme/Allergies: Negative for environmental allergies and polydipsia. Does not bruise/bleed easily.        PAST MEDICAL HISTORY  Past Medical History:   Diagnosis Date    Allergic rhinitis     Asthma     Churg-Connie syndrome with lung involvement ([facility] [address] 9285    Complication of anesthesia     Did not have a good spinal block    SVT (supraventricular tachycardia) ([facility] [address] 2010       SOCIAL HISTORY  Social History     Socioeconomic History    Marital status:      Spouse name: None    Number of children: None    Years of education: None    Highest PLACEMENT Left 3/15, 10/15    Dr. Kimmy Reddy TYMPANOSTOMY TUBE PLACEMENT Left        ALLERGIES  Allergies   Allergen Reactions    Biaxin [Clarithromycin] Hives    Ceclor [Cefaclor] Hives       CURRENT MEDICATIONS  Current Outpatient Medications   Medication Sig Dispense Refill    acetaminophen (TYLENOL) 500 MG tablet Take 500 mg by mouth every 6 hours as needed for Pain      senna-docusate (PERICOLACE) 8.6-50 MG per tablet Take 2 tablets by mouth daily as needed for Constipation 30 tablet 0    Multiple Vitamin (MULTI-VITAMIN DAILY PO) Take by mouth      predniSONE (DELTASONE) 5 MG tablet Take 4 tablets by mouth daily 120 tablet 1    predniSONE (DELTASONE) 1 MG tablet Take 3 tablets by mouth daily 90 tablet 1    sulfamethoxazole-trimethoprim (BACTRIM DS) 800-160 MG per tablet Take 1 tabelt daily Monday, Wednesday , Friday 36 tablet 1    CALCIUM PO Take by mouth      omeprazole (PRILOSEC) 20 MG delayed release capsule TAKE 1 CAPSULE BY MOUTH ONE TIME A DAY  90 capsule 3    mepolizumab (NUCALA) 100 MG SOLR injection Once monthly      SPRINTEC 28 0.25-35 MG-MCG per tablet Take by mouth daily      levocetirizine (XYZAL) 5 MG tablet Take 5 mg by mouth as needed      albuterol (PROVENTIL HFA;VENTOLIN HFA) 108 (90 BASE) MCG/ACT inhaler Inhale 2 puffs into the lungs every 6 hours as needed for Wheezing or Shortness of Breath      budesonide-formoterol (SYMBICORT) 160-4.5 MCG/ACT AERO Inhale 2 puffs into the lungs 2 times daily. No current facility-administered medications for this visit. Objective:  /74 (Site: Left Upper Arm, Position: Sitting, Cuff Size: Medium Adult)   Pulse 75   Ht 5' 7.99\" (1.727 m)   Wt 186 lb (84.4 kg)   LMP 03/18/2019   SpO2 100%   BMI 28.29 kg/m²     General: No distress. Alert. Eyes: P ERRL. No sclera icterus. No conjunctival injection. ENT: No discharge. Pharynx clear. Ears clear, TM w/o injection, ear canals clear. Sinus: non-tender.    Neck: Trachea midline. Normal thyroid. Resp: No accessory muscle use. No crackles. + wheezing in the left posterior lung fields  CV: Regular rate. Regular rhythm. No mumur or rub. No edema. M/S:   Upper extremities:  Muscle strength 5/5, FROM, No Synovitis     Lower Extremities:   Muscle strength 5/5, FROM, No Synovitis     Neuro: DTR's 2/4 bilat and equal  Psych: Oriented to person, place, time. No anxiety or agitation. Skin: Warm and dry. No nodule on exposed extremities. No rash on exposed extremities. Lymph: No cervical LAD. No supraclavicular LAD.       RAPID 3: 0+0+3 = 3     LABS:  CBC  Lab Results   Component Value Date    WBC 7.6 03/22/2019    RBC 4.13 03/22/2019    HGB 12.6 03/22/2019    HCT 39.1 03/22/2019    MCV 94.7 03/22/2019    MCH 30.5 03/22/2019    MCHC 32.2 03/22/2019    RDW 13.1 12/16/2012     03/22/2019       CMP  Lab Results   Component Value Date    CALCIUM 8.6 03/22/2019    LABALBU 3.1 03/22/2019    PROT 6.1 03/22/2019     03/22/2019    K 5.0 03/22/2019    K 4.1 03/20/2019    CO2 29 03/22/2019     03/22/2019    BUN 6 03/22/2019    CREATININE 0.5 03/22/2019     03/22/2019    AST 80 03/22/2019       HgBA1c: No components found for: HGBA1C    No results found for: TSHFT4, TSH  No results found for: VITD25    Labs 12/21/16: WBC 8.1 (eos 1.5%, ), Hgb 13.6, Hct 40.9%, Plts 218k, Creatinine 0.6, AST 16, ALT 13    Labs 2/22/17: WBC 9.8 (eos 2.5%, ), Hgb 14.5, Hct 45.1%, Plts 266k, Creatinine 0.6, AST 18, ALT 16    Labs 4/6/17: WBC 9.9 (eos 1.9%, ), Hgb 13.4, Hct 41.2%, Plts 225k, Creatinine 0.7, AST 15, ALT 15    Labs 6/16/17: WBC 10.4 (eos 1.4%, ), Hgb 13.9, Hct 42.0%, Plts 185k, Creatinine 0.6, AST 16, ALT 15      No results found for: SANDRINE  No components found for: SSAABIGGREF  No components found for: SSBABIGGREF  No components found for: SMITHABIGGAR  No results found for: DSDNAAB   No results found for: C3, C4  No components found for: [address]  No results found for: RF    No components found for: Gwendalyn Tyler  No results found for: SEDRATE  No results found for: CRP    RADIOLOGY:     Assessment/ Plan:      Churg-crystal syndrome  - dx'ed 5/2009: prolonged h/o allergies, recurrent asthma. Increased sinonasal sx's aroudn 2006. 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 (+) skin rash on bilateral legs, and Rt arm w/ biopsy revealing perivascular dermatitis w/ prominent eosinophils. Paresthesias resoled after therapy started. Negative ANCA's in 2009.   - Prior tx AZA (9/09-2/12,7/13-9/16),Mepolizumab 100mg qd  (3/16-Aug/2018)   - followed and Treated by Dr. Enrrique Kruse at [facility] since 2009.    - holding on imuran at this time based upon her discussion with Dr. Enrrique Kruse at [address] [place] [address]   - Current therapy    - Mepolizumab 300mg q month (sept 2018)    - Prednisone currently 5mg daily,(5/09-present)    - Bactrim DS M/W/F  ---- discussed possible d/c given stability and lower dosing of steroids. Chronic systemic steroids. - prednisone 5mg daily with intermittent burst depending upon sinus issues. - discussed calcium  < 1500mg day of supplementation, and vitamin D supplementation.   - DXA normal in 2016  - Bactrim DS M/W/F -- discussed possible d/c given stability and lower dosing of steroids. Medication monitoring:   - CBC, CMP, ESR, CRP, U/A -- request labs from [employer]. Return in about 6 months (around 10/9/2019).     Electronically signed by Elsa Hankins DO on 4/9/2019 at 7:47 AM     -- if stable at next visit may try to change to once yearly f/u with q 6 with Dr. Enrrique Kruse

## 2019-08-07 DIAGNOSIS — K21.9 GASTROESOPHAGEAL REFLUX DISEASE, ESOPHAGITIS PRESENCE NOT SPECIFIED: ICD-10-CM

## 2019-08-07 RX ORDER — OMEPRAZOLE 20 MG/1
CAPSULE, DELAYED RELEASE ORAL
Qty: 30 CAPSULE | Refills: 2 | Status: SHIPPED | OUTPATIENT
Start: 2019-08-07 | End: 2019-09-24 | Stop reason: SDUPTHER

## 2019-08-07 NOTE — TELEPHONE ENCOUNTER
This medication refill is regarding a electronic request.      Requested Prescriptions     Pending Prescriptions Disp Refills    omeprazole (PRILOSEC) 20 MG delayed release capsule [Pharmacy Med Name: Omeprazole Oral Capsule Delayed Release 20 MG] 30 capsule 2     Sig: TAKE 1 CAPSULE BY MOUTH ONE TIME A DAY       Date of last visit: 9/18/2018  Date of next visit: 9/24/2019  Date of last refill: 8/31/18   [facility]arm[facility]: [facility]        Rx verified, ordered and set to EP.

## 2019-09-24 ENCOUNTER — OFFICE VISIT (OUTPATIENT)
Dept: FAMILY MEDICINE CLINIC | Age: 39
End: 2019-09-24
Payer: COMMERCIAL

## 2019-09-24 VITALS
TEMPERATURE: 97.1 F | WEIGHT: 194 LBS | HEART RATE: 68 BPM | RESPIRATION RATE: 16 BRPM | HEIGHT: 67 IN | DIASTOLIC BLOOD PRESSURE: 84 MMHG | BODY MASS INDEX: 30.45 KG/M2 | SYSTOLIC BLOOD PRESSURE: 138 MMHG

## 2019-09-24 DIAGNOSIS — M30.1 CHURG-STRAUSS SYNDROME (HCC): ICD-10-CM

## 2019-09-24 DIAGNOSIS — Z00.00 WELL ADULT EXAM: Primary | ICD-10-CM

## 2019-09-24 DIAGNOSIS — J30.9 ALLERGIC RHINITIS, UNSPECIFIED SEASONALITY, UNSPECIFIED TRIGGER: ICD-10-CM

## 2019-09-24 DIAGNOSIS — J33.9 NASAL POLYPS: ICD-10-CM

## 2019-09-24 DIAGNOSIS — Z23 NEED FOR PROPHYLACTIC VACCINATION AGAINST STREPTOCOCCUS PNEUMONIAE (PNEUMOCOCCUS): ICD-10-CM

## 2019-09-24 DIAGNOSIS — K21.9 GASTROESOPHAGEAL REFLUX DISEASE, ESOPHAGITIS PRESENCE NOT SPECIFIED: ICD-10-CM

## 2019-09-24 DIAGNOSIS — D72.18 CHURG-STRAUSS SYNDROME (HCC): ICD-10-CM

## 2019-09-24 PROCEDURE — 90670 PCV13 VACCINE IM: CPT | Performed by: FAMILY MEDICINE

## 2019-09-24 PROCEDURE — 99396 PREV VISIT EST AGE 40-64: CPT | Performed by: FAMILY MEDICINE

## 2019-09-24 PROCEDURE — 90471 IMMUNIZATION ADMIN: CPT | Performed by: FAMILY MEDICINE

## 2019-09-24 RX ORDER — OMEPRAZOLE 20 MG/1
20 CAPSULE, DELAYED RELEASE ORAL DAILY
Qty: 90 CAPSULE | Refills: 3 | Status: SHIPPED | OUTPATIENT
Start: 2019-09-24 | End: 2020-09-22 | Stop reason: SDUPTHER

## 2019-09-24 ASSESSMENT — ENCOUNTER SYMPTOMS
SHORTNESS OF BREATH: 0
VOMITING: 0
NAUSEA: 0
CHEST TIGHTNESS: 0
CONSTIPATION: 0
ABDOMINAL PAIN: 0
ANAL BLEEDING: 0
DIARRHEA: 0
BLOOD IN STOOL: 0

## 2019-09-24 ASSESSMENT — PATIENT HEALTH QUESTIONNAIRE - PHQ9
SUM OF ALL RESPONSES TO PHQ9 QUESTIONS 1 & 2: 0
SUM OF ALL RESPONSES TO PHQ QUESTIONS 1-9: 0
2. FEELING DOWN, DEPRESSED OR HOPELESS: 0
1. LITTLE INTEREST OR PLEASURE IN DOING THINGS: 0
SUM OF ALL RESPONSES TO PHQ QUESTIONS 1-9: 0

## 2019-10-01 ENCOUNTER — OFFICE VISIT (OUTPATIENT)
Dept: RHEUMATOLOGY | Age: 39
End: 2019-10-01
Payer: COMMERCIAL

## 2019-10-01 VITALS
HEIGHT: 67 IN | WEIGHT: 198 LBS | HEART RATE: 70 BPM | OXYGEN SATURATION: 100 % | SYSTOLIC BLOOD PRESSURE: 110 MMHG | BODY MASS INDEX: 31.08 KG/M2 | DIASTOLIC BLOOD PRESSURE: 78 MMHG

## 2019-10-01 DIAGNOSIS — D72.18 CHURG-STRAUSS SYNDROME (HCC): Primary | ICD-10-CM

## 2019-10-01 DIAGNOSIS — J34.89 NASAL SORE: ICD-10-CM

## 2019-10-01 DIAGNOSIS — Z51.81 MEDICATION MONITORING ENCOUNTER: ICD-10-CM

## 2019-10-01 DIAGNOSIS — Z79.52 CURRENT CHRONIC USE OF SYSTEMIC STEROIDS: ICD-10-CM

## 2019-10-01 DIAGNOSIS — M30.1 CHURG-STRAUSS SYNDROME (HCC): Primary | ICD-10-CM

## 2019-10-01 PROCEDURE — G8427 DOCREV CUR MEDS BY ELIG CLIN: HCPCS | Performed by: INTERNAL MEDICINE

## 2019-10-01 PROCEDURE — G8484 FLU IMMUNIZE NO ADMIN: HCPCS | Performed by: INTERNAL MEDICINE

## 2019-10-01 PROCEDURE — G8417 CALC BMI ABV UP PARAM F/U: HCPCS | Performed by: INTERNAL MEDICINE

## 2019-10-01 PROCEDURE — 1036F TOBACCO NON-USER: CPT | Performed by: INTERNAL MEDICINE

## 2019-10-01 PROCEDURE — 99214 OFFICE O/P EST MOD 30 MIN: CPT | Performed by: INTERNAL MEDICINE

## 2019-10-01 RX ORDER — PREDNISONE 1 MG/1
3 TABLET ORAL DAILY
Qty: 90 TABLET | Refills: 3 | Status: SHIPPED | OUTPATIENT
Start: 2019-10-01 | End: 2020-09-30

## 2019-10-01 RX ORDER — PREDNISONE 1 MG/1
20 TABLET ORAL DAILY
Qty: 120 TABLET | Refills: 3 | Status: SHIPPED | OUTPATIENT
Start: 2019-10-01 | End: 2020-10-06 | Stop reason: SDUPTHER

## 2019-10-01 RX ORDER — SULFAMETHOXAZOLE AND TRIMETHOPRIM 800; 160 MG/1; MG/1
TABLET ORAL
Qty: 36 TABLET | Refills: 1 | Status: SHIPPED | OUTPATIENT
Start: 2019-10-01 | End: 2020-10-06 | Stop reason: SDUPTHER

## 2019-10-01 ASSESSMENT — ENCOUNTER SYMPTOMS
WHEEZING: 0
COUGH: 0
EYE PAIN: 0
BACK PAIN: 0
DOUBLE VISION: 0
EYE DISCHARGE: 0
HEARTBURN: 1
ORTHOPNEA: 0
BLURRED VISION: 0
PHOTOPHOBIA: 0
SINUS PAIN: 0
HEMOPTYSIS: 0
SHORTNESS OF BREATH: 0

## 2019-11-26 ENCOUNTER — NURSE ONLY (OUTPATIENT)
Dept: FAMILY MEDICINE CLINIC | Age: 39
End: 2019-11-26
Payer: COMMERCIAL

## 2019-11-26 DIAGNOSIS — Z23 NEED FOR PNEUMOCOCCAL VACCINATION: Primary | ICD-10-CM

## 2019-11-26 PROCEDURE — 90471 IMMUNIZATION ADMIN: CPT | Performed by: FAMILY MEDICINE

## 2019-11-26 PROCEDURE — 90732 PPSV23 VACC 2 YRS+ SUBQ/IM: CPT | Performed by: FAMILY MEDICINE

## 2020-05-05 LAB
CHOLESTEROL, TOTAL: 154 MG/DL
CHOLESTEROL/HDL RATIO: 2.2
HDLC SERPL-MCNC: 69 MG/DL (ref 35–70)
LDL CHOLESTEROL CALCULATED: 44 MG/DL (ref 0–160)
NONHDLC SERPL-MCNC: NORMAL MG/DL
TRIGL SERPL-MCNC: 69 MG/DL
VLDLC SERPL CALC-MCNC: NORMAL MG/DL

## 2020-09-20 NOTE — PROGRESS NOTES
[facility]  [facility]  Dept: [phone]  Dept Fax: [phone] [facility] Luke Champion is a 38 y. o.female    Pt presents for annual wellness physical exam.      Pt doing well at this time, denying any new complaints at this time. (all information below updated today, 9/22/2020)  Pt continues seeing Dr. Senait Matthew ([address] [facility]) for Churg-Connie. Pt also seeing Dr. Jeff Levine locally for Churg-Connie- currently using Prednisone 6 mg daily (maintenance dose). Pt also continues seeing Dr. Anton Navarro [facility] [place]- Asthma and Allergy)     Wt Readings from Last 3 Encounters:   09/22/20 204 lb 6.4 oz (92.7 kg)   10/01/19 198 lb (89.8 kg)   09/24/19 194 lb (88 kg)   Weight increased 10# since last visit 12 months ago. Pt admits to dietary indiscretion and lack of exercise due to COVID 19 pandemic. Now, \"back on the wan\"     Pt stable since last visit- no new problems for diagnoses listed below:  Patient Active Problem List   Diagnosis    Churg-Connie syndrome ([facility] [address]    Allergic rhinitis    Chronic sinusitis    Gastroesophageal reflux disease    Mild intermittent asthma without complication    Nasal polyps     Review of Systems   Constitutional: Negative for chills, diaphoresis, fatigue, fever and unexpected weight change. Eyes: Negative for visual disturbance. Respiratory: Negative for chest tightness and shortness of breath. Cardiovascular: Negative for chest pain, palpitations and leg swelling. Gastrointestinal: Negative for abdominal pain, anal bleeding, blood in stool, constipation, diarrhea, nausea and vomiting. Genitourinary: Negative for dysuria and hematuria. Musculoskeletal: Negative for neck pain. Neurological: Negative for dizziness, light-headedness and headaches.      OBJECTIVE     /72   Pulse 88   Temp 97.2 °F (36.2 °C) (Temporal)   Resp 12   Ht 5' 6.5\" (1.689 m)   Wt 204 lb 6.4 oz (92.7 kg)   LMP 08/24/2020   BMI 32.50 kg/m²     Wt Readings from Last 3 Encounters:   09/22/20 204 lb 6.4 oz (92.7 kg)   10/01/19 198 lb (89.8 kg)   09/24/19 194 lb (88 kg)     Physical Exam  Vitals signs and nursing note reviewed. Constitutional:       General: She is not in acute distress. Appearance: Normal appearance. She is normal weight. She is not ill-appearing, toxic-appearing or diaphoretic. HENT:      Head: Normocephalic and atraumatic. Right Ear: External ear normal.      Left Ear: External ear normal.      Nose: No rhinorrhea. Mouth/Throat:      Mouth: Mucous membranes are moist.      Pharynx: Oropharynx is clear. Eyes:      General: No scleral icterus. Right eye: No discharge. Left eye: No discharge. Extraocular Movements: Extraocular movements intact. Conjunctiva/sclera: Conjunctivae normal.      Pupils: Pupils are equal, round, and reactive to light. Neck:      Musculoskeletal: Normal range of motion. Cardiovascular:      Rate and Rhythm: Normal rate and regular rhythm. Heart sounds: Normal heart sounds. No murmur. No friction rub. No gallop. Pulmonary:      Effort: Pulmonary effort is normal. No respiratory distress. Breath sounds: Normal breath sounds. No stridor. No wheezing, rhonchi or rales. Chest:      Chest wall: No tenderness. Abdominal:      General: Abdomen is flat. Bowel sounds are normal. There is no distension. Palpations: Abdomen is soft. There is no mass. Tenderness: There is no abdominal tenderness. There is no guarding or rebound. Hernia: No hernia is present. Musculoskeletal: Normal range of motion. General: No swelling, deformity or signs of injury. Skin:     General: Skin is warm and dry. Coloration: Skin is not jaundiced or pale. Findings: No bruising, erythema, lesion or rash. Neurological:      General: No focal deficit present.       Mental Status: She is alert and oriented to person, place, and time. Mental status is at baseline. Motor: No weakness. Coordination: Coordination normal.   Psychiatric:         Mood and Affect: Mood normal.         Behavior: Behavior normal.         Thought Content: Thought content normal.         Judgment: Judgment normal.             The 10-year ASCVD risk score (Barry Abdi, et al., 2013) is: 0.2%    Values used to calculate the score:      Age: 38 years      Sex: Female      Is Non- : No      Diabetic: No      Tobacco smoker: No      Systolic Blood Pressure: 398 mmHg      Is BP treated: No      HDL Cholesterol: 69 mg/dL      Total Cholesterol: 154 mg/dL    No results found for: TSH, B7IGYIT, A1CPJZF, THYROIDAB, FT3, T4FREE        Immunization History   Administered Date(s) Administered    Influenza 10/01/2014, 10/01/2015    Pneumococcal Conjugate 13-valent ([id number]) 09/24/2019    Pneumococcal Polysaccharide ([id number]) 01/01/2008, 11/26/2019    Tdap (Boostrix, Adacel) 12/17/2012     Health Maintenance   Topic Date Due    Cervical cancer screen  05/01/2018    Flu vaccine (1) 09/01/2020    Diabetes screen  10/19/2021    DTaP/Tdap/Td vaccine (2 - Td) 12/17/2022    Lipid screen  05/10/2023    Pneumococcal 0-64 years Vaccine  Completed    HIV screen  Completed    Hepatitis A vaccine  Aged Out    Hepatitis B vaccine  Aged Out    Hib vaccine  Aged Out    Meningococcal (ACWY) vaccine  Aged Out        AAA ultrasound (Male, 65-75, smoked ever) indicated at this time? No tobacco history  CT Lung Screen (55-80, 30 pk-yrs, smoking or quit <15 years) indicated at this time? No tobacco history      Future Appointments   Date Time Provider Mihaela Dong   10/6/2020  7:40 AM Cassandra Lawson, DO [facility] [place]     ASSESSMENT       Diagnosis Orders   1. Well adult exam     2. Churg-Connie syndrome ([address]     3. Mild intermittent asthma without complication     4.  Gastroesophageal reflux disease, esophagitis presence not specified  omeprazole (PRILOSEC) 20 MG delayed release capsule   5. Allergic rhinitis, unspecified seasonality, unspecified trigger     6. Nasal polyps     7. Lipid screening     8. Class 1 obesity without serious comorbidity with body mass index (BMI) of 32.0 to 32.9 in adult, unspecified obesity type  T4, Free    TSH without Reflex     PLAN      Encouraged annual FLU VACCINE after October 1st- to get per employer ([employer]). (updated 9/22/2020)  PAP/PELVIC management per Dr. Yousif Gamboa- last appt 6/16/2020- to follow up annually. (update 9/22/2020)  MAMMO to be done 9/29/2020  DEXA management per Dr. Elizabet Sanz at [facility] (done at [address] to persistent steroid use- to do again 9/24/2020. Pt to drop copy off to office. (updated 9/22/2020)   CBC, ESR, CRP, UA, and CMP done monthly per Dr. Jacob Urrutia- all ok per pt report. Check TSH/ FREE T4 with next labs per Dr. Jacob Urrutia. Follow up with Dr. Seng Negrete, Dr. Varun Regalado [facility]- Rheumatology) , Dr. Imogene Favre (Allergy/ Asthma), and Dr. Damian Zaman ([facility]) as scheduled. Continue to work on diet, exercise, and weight loss for optimal cardiovascular health. Continue current medicines.    Refills   Follow up in 12 months.             Electronically signed Kaleb Musa MD on 9/22/2020 at 9:30 AM

## 2020-09-22 ENCOUNTER — OFFICE VISIT (OUTPATIENT)
Dept: FAMILY MEDICINE CLINIC | Age: 40
End: 2020-09-22
Payer: COMMERCIAL

## 2020-09-22 VITALS
HEART RATE: 88 BPM | BODY MASS INDEX: 32.08 KG/M2 | TEMPERATURE: 97.2 F | WEIGHT: 204.4 LBS | HEIGHT: 67 IN | DIASTOLIC BLOOD PRESSURE: 72 MMHG | SYSTOLIC BLOOD PRESSURE: 118 MMHG | RESPIRATION RATE: 12 BRPM

## 2020-09-22 PROCEDURE — 99396 PREV VISIT EST AGE 40-64: CPT | Performed by: FAMILY MEDICINE

## 2020-09-22 RX ORDER — OMEPRAZOLE 20 MG/1
20 CAPSULE, DELAYED RELEASE ORAL DAILY
Qty: 90 CAPSULE | Refills: 3 | Status: SHIPPED | OUTPATIENT
Start: 2020-09-22 | End: 2021-09-30 | Stop reason: SDUPTHER

## 2020-09-22 ASSESSMENT — ENCOUNTER SYMPTOMS
SHORTNESS OF BREATH: 0
BLOOD IN STOOL: 0
ABDOMINAL PAIN: 0
ANAL BLEEDING: 0
DIARRHEA: 0
CONSTIPATION: 0
NAUSEA: 0
CHEST TIGHTNESS: 0
VOMITING: 0

## 2020-09-22 ASSESSMENT — PATIENT HEALTH QUESTIONNAIRE - PHQ9
SUM OF ALL RESPONSES TO PHQ9 QUESTIONS 1 & 2: 0
SUM OF ALL RESPONSES TO PHQ QUESTIONS 1-9: 0
SUM OF ALL RESPONSES TO PHQ QUESTIONS 1-9: 0
2. FEELING DOWN, DEPRESSED OR HOPELESS: 0
1. LITTLE INTEREST OR PLEASURE IN DOING THINGS: 0

## 2020-10-06 ENCOUNTER — OFFICE VISIT (OUTPATIENT)
Dept: RHEUMATOLOGY | Age: 40
End: 2020-10-06
Payer: COMMERCIAL

## 2020-10-06 VITALS
OXYGEN SATURATION: 97 % | BODY MASS INDEX: 32.84 KG/M2 | TEMPERATURE: 97.3 F | WEIGHT: 204.37 LBS | HEIGHT: 66 IN | DIASTOLIC BLOOD PRESSURE: 80 MMHG | HEART RATE: 88 BPM | SYSTOLIC BLOOD PRESSURE: 110 MMHG

## 2020-10-06 PROCEDURE — 1036F TOBACCO NON-USER: CPT | Performed by: INTERNAL MEDICINE

## 2020-10-06 PROCEDURE — 99213 OFFICE O/P EST LOW 20 MIN: CPT | Performed by: INTERNAL MEDICINE

## 2020-10-06 PROCEDURE — G8484 FLU IMMUNIZE NO ADMIN: HCPCS | Performed by: INTERNAL MEDICINE

## 2020-10-06 PROCEDURE — G8417 CALC BMI ABV UP PARAM F/U: HCPCS | Performed by: INTERNAL MEDICINE

## 2020-10-06 PROCEDURE — G8427 DOCREV CUR MEDS BY ELIG CLIN: HCPCS | Performed by: INTERNAL MEDICINE

## 2020-10-06 RX ORDER — PREDNISONE 1 MG/1
20 TABLET ORAL DAILY
Qty: 120 TABLET | Refills: 3 | Status: SHIPPED | OUTPATIENT
Start: 2020-10-06 | End: 2021-10-12 | Stop reason: SDUPTHER

## 2020-10-06 RX ORDER — SULFAMETHOXAZOLE AND TRIMETHOPRIM 800; 160 MG/1; MG/1
TABLET ORAL
Qty: 36 TABLET | Refills: 1 | Status: SHIPPED | OUTPATIENT
Start: 2020-10-06 | End: 2021-09-30 | Stop reason: ALTCHOICE

## 2020-10-06 ASSESSMENT — ENCOUNTER SYMPTOMS
CONSTIPATION: 0
EYES NEGATIVE: 1
VOMITING: 0
SHORTNESS OF BREATH: 0
WHEEZING: 1
EYE REDNESS: 0
DIARRHEA: 0
COUGH: 0
EYE PAIN: 0
NAUSEA: 0

## 2020-10-06 NOTE — PROGRESS NOTES
[facility] RHEUMATOLOGY FOLLOW UP NOTE       Date Of Service: 10/6/2020  Provider: Loni Culp DO    Name: Kalli Nice   MRN: [id number]    Chief Complaint(s)     Chief Complaint   Patient presents with    Follow-up     1 year Churg-Connie Syndrome        History of Present Illness (HPI)     Kalli Nice  is a(n)40 y.o. female with a hx ofchurg-Connie syndrome, Chronic sinusitis, GERD, Mild intermittent Asthma, allergic rhinitis here for the f/u  evaluation of her Churg-Connie, chronic prednisone use .     Per pt and chart review she had a long standing h/o allergies and asthma, that worsened around 2002. In 2006 she began having progressive sxs including sinonasal symptoms,  Surgery in 10/2007 with septal deviation and polyp removal. Severe recurrent asthma, increasing eosinophilia up to 55%, severe arthralgias w/o swelling, fatigue, numbness in bilateral feet, and skin rash (legs and forearms) with bx revealing pervsacular dermatitis with prominent eosinophils. Stablized with prednisone during 2008 pregnancy. Started seeing Dr. Aries Cesar in 2008 and diagnosed with Karon Mckenna ([facility]). She had Recurrence of rash in 2009 on b/l lower extremities that resolved with prednisone but returned with tapering.      Initialy evaluated at [facility] 8/18/09 at which time Azathioprine was discussed which she started on 9/09. Hyun Miller Sxs improved and prednisone was taper w/o recurrence of rash. She wanted to pursue another child so the imuran was tapered and discontinued on 2/12. She conceived and her pregnancy went well and was far less complicated than her first pregnancy. Daughter deliver on 12/12. Increased sinonasal sxs and asthmatic features with peripheral einophila increased around her 7/13 evaluation. Imuran restarted. In 3/2016 Mepolizumab 100mg/victor hugo was started and her asthma significantly improved, w/ improvement of the PFT, Exertional SOB. Pt weaned off Imuran on 9/16.    Prednisone down to 7mg daily 1/17. continued recurrent sinusitis from nasal polyps but her asthma and vasculitis were controlled. SEEING Allergist and ENT currently.   Interval hx. Churg connie: - sinus surgery - in office Nov 2019 w/ improvement of congestion. - 1 sinus infection since the last evaluation. Mild seasonal congestion of ears and sinus , mild postnalsal drip. - prednisone @ 6mg daily. Ear congestions of the left year. Denies recurrent of the rash on the arms. .  PND. SOB, VERA, cough, leg swelling, orthopenia, PND, Wheezing, joint pains/arthralgia, urinary changes, hematuria, foamy urination, paresthesia, chest pain, chest tightness. Current therapy:   - Levocetirizine 5mg   - Mepolizumab 300mg (sept 2018 from 100mg qd since [id number])  - Prednisone 6mg daily   - Bactrim DS M/W/F -- Dr. Luther Diop: (ROS)    Review of Systems   Constitutional: Negative for diaphoresis, fatigue and fever. HENT: Positive for congestion. Negative for hearing loss and nosebleeds. Eyes: Negative. Negative for pain and redness. Respiratory: Positive for wheezing. Negative for cough and shortness of breath. Cardiovascular: Negative. Negative for chest pain. Gastrointestinal: Negative for constipation, diarrhea, nausea and vomiting. Genitourinary: Negative for difficulty urinating, frequency and hematuria. Musculoskeletal: Negative for myalgias. Skin: Negative for rash. Neurological: Negative for dizziness, weakness and headaches. Hematological: Does not bruise/bleed easily. Psychiatric/Behavioral: Negative for sleep disturbance. The patient is not nervous/anxious. PAST MEDICAL HISTORY     has a past medical history of Allergic rhinitis, Asthma, Churg-Connie syndrome with lung involvement ([address] Complication of anesthesia, and SVT (supraventricular tachycardia) ([address]      PAST SURGICAL HISTORY     has a past surgical history that includes sinus surgery (2007); Tonsillectomy and adenoidectomy;  section (2008); sinus surgery (2014); Tympanostomy tube placement (Left, 3/15, 10/15); Tympanostomy tube placement (Left); ERCP (N/A, 3/20/2019); ERCP (3/20/2019); Cholecystectomy, laparoscopic (N/A, 3/22/2019); and sinus surgery (Bilateral, 2019). Ruddy Chiang FAMILY HISTORY      Family History   Problem Relation Age of Onset    High Blood Pressure Mother     Arthritis Mother     Cancer Maternal Grandmother         Breast cancer    Arthritis Maternal Grandmother     Arthritis Maternal Grandfather     Alzheimer's Disease Maternal Grandfather     Alzheimer's Disease Paternal Grandmother        SOCIAL HISTORY     reports that she has never smoked. She has never used smokeless tobacco. She reports that she does not drink alcohol or use drugs.     ALLERGIES     Allergies   Allergen Reactions    Biaxin [Clarithromycin] Hives    Ceclor [Cefaclor] Hives       CURRENT MEDICATIONS      Current Outpatient Medications:     sulfamethoxazole-trimethoprim (BACTRIM DS) 800-160 MG per tablet, Take 1 tabelt daily Monday, Wednesday , Friday, Disp: 36 tablet, Rfl: 1    predniSONE (DELTASONE) 5 MG tablet, Take 4 tablets by mouth daily, Disp: 120 tablet, Rfl: 3    omeprazole (PRILOSEC) 20 MG delayed release capsule, Take 1 capsule by mouth Daily, Disp: 90 capsule, Rfl: 3    Fluticasone Propionate (XHANCE) 93 MCG/ACT EXHU, by Nasal route 2 times daily, Disp: , Rfl:     Multiple Vitamin (MULTI-VITAMIN DAILY PO), Take by mouth, Disp: , Rfl:     CALCIUM PO, Take by mouth, Disp: , Rfl:     mepolizumab (NUCALA) 100 MG SOLR injection, Once monthly, Disp: , Rfl:     SPRINTEC 28 0.25-35 MG-MCG per tablet, Take by mouth daily, Disp: , Rfl:     levocetirizine (XYZAL) 5 MG tablet, Take 5 mg by mouth as needed, Disp: , Rfl:     albuterol (PROVENTIL HFA;VENTOLIN HFA) 108 (90 BASE) MCG/ACT inhaler, Inhale 2 puffs into the lungs every 6 hours as needed for Wheezing or Shortness of Breath, Disp: , Rfl:     budesonide-formoterol (SYMBICORT) 160-4.5 MCG/ACT AERO, Inhale 2 puffs into the lungs 2 times daily. , Disp: , Rfl:     PHYSICAL EXAMINATION / OBJECTIVE     Objective:  /80 (Site: Left Upper Arm, Position: Sitting, Cuff Size: Large Adult)   Pulse 88   Temp 97.3 °F (36.3 °C)   Ht 5' 6.5\" (1.689 m)   Wt 204 lb 5.9 oz (92.7 kg)   SpO2 97%   BMI 32.50 kg/m²     General Appearance: General appearance:  AAO x 3 ,  well-developed and well nourished  Head: normocephalic and atraumatic  Eyes: No gross abnormalities. ,  Sclera non-icteric  Ears / nose:  normal external appearance of left and right ear and nose. No active drainage from nose. - + ear congestions - left ear  mouth:  MMM, ears w/o deformities  Neck: No jugular venous distention, appears symmetric, good ROM  Lymph:  No adenopathy   Pulmonary/Chest: CTA bilateral ,  symmetric chest expansion. Cardiovascular: Normal S1 and S2, NO murmur, rub, gallop  : Deferred   Abd/GI: Deferred   Neurologic:  speech normal,   no facial drooping, no asterixis  Skin:  No rash on exposed extremities. Extremities:   Musculoskeletal:  Non-tender, no swelling       Exam KEY:   Tender :  T    Swelling: S,   Deformities: D,   Non-tender : NT  ,  No swelling: NS       RAPID 3:   10/6/2020 --- RAPID 3:  +  +  =        Remission: <3  Low Disease Activity: <6  Moderate Disease Activity: >=6 and <=12  High Disease Activity: >12     LABS      CBC  Lab Results   Component Value Date    WBC 7.6 03/22/2019    WBC 10.4 03/21/2019    WBC 6.3 03/20/2019    RBC 4.13 03/22/2019    HGB 12.6 03/22/2019    HGB 12.3 03/21/2019    HGB 13.2 03/20/2019    HCT 39.1 03/22/2019    MCV 94.7 03/22/2019    RDW 13.1 12/16/2012     03/22/2019     03/21/2019     03/20/2019       CMP  Lab Results   Component Value Date    CALCIUM 8.6 03/22/2019    LABALBU 3.1 03/22/2019    PROT 6.1 03/22/2019     03/22/2019    K 5.0 03/22/2019    K 4.1 03/20/2019 CO2 29 03/22/2019     03/22/2019    BUN 6 03/22/2019    CREATININE 0.5 03/22/2019    CREATININE 0.4 03/21/2019    CREATININE 0.3 03/20/2019    ALKPHOS 166 03/22/2019     03/22/2019     03/21/2019     03/20/2019    AST 80 03/22/2019     03/21/2019     03/20/2019       HgBA1c: No components found for: HGBA1C    No results found for: VITD25    No results found for: ANASCRN  No results found for: SSA  No results found for: SSB  No results found for: ANTI-SMITH  No results found for: DSDNAAB   No results found for: ANTIRNP  No results found for: C3, C4  No results found for: CCPAB  No results found for: RF    No components found for: CANCASCRN, APANCASCRN  No results found for: SEDRATE  No results found for: CRP      RADIOLOGY / PROCEDURES:         ASSESSMENT/PLAN    Assessment   Plan     1. Churg-Connie syndrome ([facility] [address]    2. Current chronic use of systemic steroids    3. Medication monitoring encounter    4. Nasal sore        Churg-connie syndrome  - dx'ed 5/2009: prolonged h/o allergies, recurrent asthma. Increased sinonasal sx's aroudn 2006. 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 (+) skin rash on bilateral legs, and Rt arm w/ biopsy revealing perivascular dermatitis w/ prominent eosinophils. Paresthesias resoled after therapy started. Negative ANCA's in 2009.    - Prior tx AZA (9/09-2/12,7/13-9/16),Mepolizumab 100mg qd (3/16-Aug/2018)    - followed and Treated by Dr. Bao Rodriguez at [facility] since 2009.                  Current therapy                - Mepolizumab 300mg q month (sept 2018)                - Prednisone currently 6 mg daily (5/09-present) - attempt to wean to 5mg daily.                 - Bactrim DS M/W/F -- added benefit w/ continued use per [facility] provider.      Chronic systemic steroids. - prednisone 6mg daily with intermittent burst depending upon sinus issues.     - calcium  < 1500mg day of supplementation, and vitamin D supplementation.    - DXA normal in 2016   - Bactrim DS M/W/F   - REQUEST DEXA FROM [facility] - performed Sept 2020.      Medication monitoring:   - CBC, CMP, ESR, CRP, U/A -- labs from [facility] stable. No follow-ups on file. Electronically signed by Ash Peña DO on 10/6/2020 at 2:19 PM    New Prescriptions    No medications on file       Thank you for allowing me to participate in the care of this patient. Please call if there are any questions.

## 2021-09-26 NOTE — PROGRESS NOTES
[facility]  [facility]  Dept: [phone]  Dept Fax: [phone] Fara Rankin is a 42 y. o.female    Pt presents for annual wellness physical exam.      Patient also presents for follow-up of Churg-Connie syndrome, allergic rhinitis, GERD, and mild intermittent asthma     Pt diagnosed with COVID 19 on 9/27/2021- having lung pain, nausea, cough, fatigue, body aches. Pt set up for Regeneron via [address] today at 2 pm after diagnosed with COVID 19. Both specialists in complete agreement with proceeding with Regeneron. On Vitamin C, Vitamin D, and Zinc at this time. Pt continues seeing Dr. Mike Francis ([address] [facility]) for Churg-Connie. Pt also seeing Dr. Amarjit Mendoza locally for Churg-Connie- currently using Prednisone  Pt also continues seeing Dr. Jones Kinds [facility] [place]- Asthma and Allergy)      Pt stable since last visit- no new problems for diagnoses listed below:  Patient Active Problem List   Diagnosis    Churg-Connie syndrome ([facility] [address]    Allergic rhinitis    Chronic sinusitis    Gastroesophageal reflux disease    Mild intermittent asthma without complication    Nasal polyps    COVID-19    Class 1 obesity due to excess calories with body mass index (BMI) of 32.0 to 32.9 in adult       Review of Systems   Constitutional: Negative for chills, diaphoresis, fatigue, fever and unexpected weight change. Eyes: Negative for visual disturbance. Respiratory: Negative for chest tightness and shortness of breath. Cardiovascular: Negative for chest pain, palpitations and leg swelling. Gastrointestinal: Negative for abdominal pain, anal bleeding, blood in stool, constipation, diarrhea, nausea and vomiting. Genitourinary: Negative for dysuria and hematuria. Musculoskeletal: Negative for neck pain. Neurological: Negative for dizziness, light-headedness and headaches.      OBJECTIVE     Patient-Reported Vitals 9/30/2021   Patient-Reported Weight 203   Patient-Reported Height 57   Patient-Reported Systolic 673   Patient-Reported Diastolic 78   Patient-Reported Pulse 72   Patient-Reported Temperature 98.2        Wt Readings from Last 3 Encounters:   10/06/20 204 lb 5.9 oz (92.7 kg)   09/22/20 204 lb 6.4 oz (92.7 kg)   10/01/19 198 lb (89.8 kg)   Weight decreased 1# since last visit. Due to this being a TeleHealth encounter, evaluation of the following organ systems is limited: Vitals/Constitutional/EENT/Resp/CV/GI//MS/Neuro/Skin/Heme-Lymph-Imm. PHYSICAL EXAMINATION:  [ INSTRUCTIONS:  \"[x]\" Indicates a positive item  \"[]\" Indicates a negative item  -- DELETE ALL ITEMS NOT EXAMINED]  Vital Signs: (All Vital Signs are pt reported, unless otherwise noted)   There were no vitals taken for this visit. Constitutional: [x] Appears well-developed and well-nourished [x] No apparent distress      [] Abnormal-   Mental status  [x] Alert and awake  [x] Oriented to person/place/time [x]Able to follow commands      Eyes:  EOM    [x]  Normal  [] Abnormal-  Sclera  [x]  Normal  [] Abnormal -         Discharge []  None visible  [] Abnormal -    HENT:   [x] Normocephalic, atraumatic.   [] Abnormal   [x] Mouth/Throat: Mucous membranes are moist.     External Ears [x] Normal  [] Abnormal-     Neck: [x] No visualized mass     Pulmonary/Chest: [x] Respiratory effort normal.  [x] No visualized signs of difficulty breathing or respiratory distress        [] Abnormal-      Musculoskeletal:   [] Normal gait with no signs of ataxia         [x] Normal range of motion of neck        [] Abnormal-       Neurological:        [x] No Facial Asymmetry (Cranial nerve 7 motor function) (limited exam to video visit)          [x] No gaze palsy        [] Abnormal-         Skin:        [x] No significant exanthematous lesions or discoloration noted on facial skin         [] Abnormal-            Psychiatric:       [x] Normal Affect [x] No Hallucinations        [] Abnormal-     Other pertinent observable physical exam findings-                        Lab Results   Component Value Date    LABA1C 5.4 10/19/2018     No results found for: EAG    Lab Results   Component Value Date    CHOL 154 05/05/2020    TRIG 69 05/05/2020    HDL 69 05/05/2020    LDLCALC 44 05/05/2020       The 10-year ASCVD risk score (Wojciech Jose, et al., 2013) is: 0.2%    Values used to calculate the score:      Age: 42 years      Sex: Female      Is Non- : No      Diabetic: No      Tobacco smoker: No      Systolic Blood Pressure: 129 mmHg      Is BP treated: No      HDL Cholesterol: 69 mg/dL      Total Cholesterol: 154 mg/dL    Lab Results   Component Value Date     03/22/2019    K 5.0 03/22/2019     03/22/2019    CO2 29 03/22/2019    BUN 6 (L) 03/22/2019    CREATININE 0.5 03/22/2019    GLUCOSE 102 03/22/2019    CALCIUM 8.6 03/22/2019    PROT 6.1 03/22/2019    LABALBU 3.1 (L) 03/22/2019    BILITOT 1.1 03/22/2019    ALKPHOS 166 (H) 03/22/2019    AST 80 (H) 03/22/2019     (H) 03/22/2019    LABGLOM >90 03/22/2019       No results found for: TSH, E0WXGKG, E5NZMGM, THYROIDAB, FT3, T4FREE    Lab Results   Component Value Date    WBC 7.6 03/22/2019    HGB 12.6 03/22/2019    HCT 39.1 03/22/2019    MCV 94.7 03/22/2019     03/22/2019         Immunization History   Administered Date(s) Administered    Influenza 10/01/2014, 10/01/2015    Pneumococcal Conjugate 13-valent ([id number]) 09/24/2019    Pneumococcal Polysaccharide ([id number]) 01/01/2008, 11/26/2019    Tdap (Boostrix, Adacel) 12/17/2012       Health Maintenance   Topic Date Due    COVID-19 Vaccine (1) Never done    Flu vaccine (1) 09/01/2021    Diabetes screen  10/19/2021    DTaP/Tdap/Td vaccine (2 - Td or Tdap) 12/17/2022    Cervical cancer screen  06/22/2024    Lipid screen  05/05/2025    Pneumococcal 0-64 years Vaccine (2 of 2 - PPSV23) 09/04/2044    HIV screen  Completed  Hepatitis A vaccine  Aged Out    Hepatitis B vaccine  Aged Out    Hib vaccine  Aged Out    Meningococcal (ACWY) vaccine  Aged Out    Hepatitis C screen  Discontinued       CT Lung Screen (50-80, 20 pk-yrs, smoking or quit <15 years) indicated at this time? No tobacco history  Sleep Medicine referral indicated at this time (Obesity, Snoring, Daytime Somnolence, Apneic Episodes)? Pt denies any current symptoms. Future Appointments   Date Time Provider Mihaela Dong   9/30/2021  2:00 PM STR EXAM ROOM 3 COVID INFUSION STRZ OP NURS [facility]OD   10/12/2021  7:40 AM Trayton B Wei, DO N [facility] [place] [address]       ASSESSMENT       Diagnosis Orders   1. Well adult exam     2. Churg-Connie syndrome ([facility] [address]     3. Mild intermittent asthma without complication     4. Allergic rhinitis, unspecified seasonality, unspecified trigger     5. Nasal polyps     6. Class 1 obesity without serious comorbidity with body mass index (BMI) of 32.0 to 32.9 in adult, unspecified obesity type     7. Laboratory exam ordered as part of routine general medical examination  Lipid Panel   8. Lipid screening  Lipid Panel   9. Screening for diabetes mellitus     10. Gastroesophageal reflux disease, unspecified whether esophagitis present  omeprazole (PRILOSEC) 20 MG delayed release capsule       PLAN        Follow up with Dr. Gisell Smart, Dr. Jasmina Turner ([address]), Dr. Charla French (Allergy/ Asthma), and Dr. Harjinder Cedillo ([facility]) FA scheduled/ as needed. (updated 9/30/2021)  Continue to work on diet, exercise, and weight loss for optimal cardiovascular health. Continue current medicines. Refills   Await recent TFT's per pt.     Await monthly CBC, CMP, UA, ESR, and CRP per Dr. Payton Sue at this time   Follow up in 12 months.            Preventive Health Topics:  Pt declines HEP C screening at this time due to lack of risk factors. Proceed with Regeneron as planned today.     Encouraged

## 2021-09-29 ENCOUNTER — TELEPHONE (OUTPATIENT)
Dept: FAMILY MEDICINE CLINIC | Age: 41
End: 2021-09-29

## 2021-09-29 DIAGNOSIS — E66.09 CLASS 1 OBESITY DUE TO EXCESS CALORIES WITH SERIOUS COMORBIDITY AND BODY MASS INDEX (BMI) OF 32.0 TO 32.9 IN ADULT: ICD-10-CM

## 2021-09-29 DIAGNOSIS — U07.1 COVID-19: ICD-10-CM

## 2021-09-29 PROBLEM — E66.811 CLASS 1 OBESITY DUE TO EXCESS CALORIES WITH BODY MASS INDEX (BMI) OF 32.0 TO 32.9 IN ADULT: Status: ACTIVE | Noted: 2021-09-29

## 2021-09-29 RX ORDER — SODIUM CHLORIDE 9 MG/ML
INJECTION, SOLUTION INTRAVENOUS CONTINUOUS
Status: CANCELLED | OUTPATIENT
Start: 2021-09-29

## 2021-09-29 RX ORDER — METHYLPREDNISOLONE SODIUM SUCCINATE 125 MG/2ML
125 INJECTION, POWDER, LYOPHILIZED, FOR SOLUTION INTRAMUSCULAR; INTRAVENOUS ONCE
OUTPATIENT
Start: 2021-09-29 | End: 2021-09-29

## 2021-09-29 RX ORDER — DIPHENHYDRAMINE HYDROCHLORIDE 50 MG/ML
50 INJECTION INTRAMUSCULAR; INTRAVENOUS ONCE
OUTPATIENT
Start: 2021-09-29 | End: 2021-09-29

## 2021-09-29 RX ORDER — HEPARIN SODIUM (PORCINE) LOCK FLUSH IV SOLN 100 UNIT/ML 100 UNIT/ML
500 SOLUTION INTRAVENOUS PRN
OUTPATIENT
Start: 2021-09-29

## 2021-09-29 RX ORDER — SODIUM CHLORIDE 9 MG/ML
INJECTION, SOLUTION INTRAVENOUS CONTINUOUS
OUTPATIENT
Start: 2021-09-29

## 2021-09-29 RX ORDER — SODIUM CHLORIDE 0.9 % (FLUSH) 0.9 %
5-40 SYRINGE (ML) INJECTION PRN
OUTPATIENT
Start: 2021-09-29

## 2021-09-29 RX ORDER — SODIUM CHLORIDE 9 MG/ML
25 INJECTION, SOLUTION INTRAVENOUS PRN
OUTPATIENT
Start: 2021-09-29

## 2021-09-29 RX ORDER — EPINEPHRINE 1 MG/ML
0.3 INJECTION, SOLUTION, CONCENTRATE INTRAVENOUS PRN
OUTPATIENT
Start: 2021-09-29

## 2021-09-29 NOTE — TELEPHONE ENCOUNTER
FACT SHEET FOR PATIENTS, PARENTS AND CAREGIVERS   EMERGENCY USE AUTHORIZATION (EUA) OF REGEN-COVTM (casirivimab and imdevimab) FOR CORONAVIRUS DISEASE 2019 (COVID-19)       You are being given a medicine called REGEN-COV (casirivimab and imdevimab) for the treatment or post-exposure prevention of coronavirus disease 2019 (COVID-19). SARS-CoV-2 is the virus that causes COVID-19. This Fact Sheet contains information to help you understand the potential risks and potential benefits of taking REGEN-COV. Receiving REGEN-COV may benefit certain people with [id number] and may help prevent certain people who have been exposed to someone who is infected with SARS-CoV-2 from getting SARS-CoV-2 infection, or may prevent certain people who are at high risk of exposure to someone who is infected with SARS-CoV-2 from getting SARS-CoV-2 infection. Read this Fact Sheet for information about REGEN-COV. Talk to your healthcare provider if you have questions. It is your choice to receive REGEN-COV or stop at any time. WHAT IS COVID-19? COVID-19 is caused by a virus called a coronavirus, SARS-CoV-2. People can get COVID-19 through contact with another person who has the virus. COVID-19 illnesses have ranged from very mild (including some with no reported symptoms) to severe, including illness resulting in death. While information so far suggests that most COVID-19 illness is mild, serious illness can happen and may cause some of your other medical conditions to become worse. People of all ages with severe, long-lasting (chronic) medical conditions like heart disease, lung disease, and diabetes, for example, and other conditions including obesity, seem to be at higher risk of being hospitalized for COVID-19. Older age, with or without other conditions, also places people at higher risk of being hospitalized for COVID-19. WHAT ARE THE SYMPTOMS OF COVID-19?      The symptoms of COVID-19 include fever, cough, and shortness of breath, which may appear 2 to 14 days after exposure. Serious illness including breathing problems can occur and may cause your other medical conditions to become worse. WHAT IS REGEN-COV (casirivimab and imdevimab)? REGEN-COV is an investigational medicine used in adults and adolescents (15years of age and older who weigh at least 80 pounds (40 kg)) who are at high risk for severe COVID-19, including hospitalization or death for:    treatment of mild to moderate symptoms of COVID-19    post-exposure prevention of COVID-19 in persons who are: not fully vaccinated against COVID-19 (Individuals are considered to be fully vaccinated 2 weeks after their second vaccine dose in a 2-dose series [such as the [place] or Moderna vaccines], or 2 weeks after a single-dose vaccine [such as [employer] vaccine]), or, are not expected to build up enough of an immune response to the complete COVID-19 vaccination (for example, someone with immunocompromising conditions, including someone who is taking immunosuppressive medications), and have been exposed to someone who is infected with SARS-CoV-2. Close contact with someone who is infected with SARS-CoV-2 is defined as being within 6 feet for a total of 15 minutes or more, providing care at home to someone who is sick, having direct physical contact with the person (hugging or kissing, for example), sharing eating or drinking utensils, or being exposed to respiratory droplets from an infected person (sneezing or coughing, for example). For additional details, go to https://Equipio.com/ or   someone who is at high risk of being exposed to someone who is infected with SARS-CoV-2 because of occurrence of SARS-CoV-2 infection in other individuals in the same institutional setting (for example, as nursing homes, prisons,). REGEN-COV is investigational because it is still being studied.  There is to a delay in treatment, then as an alternative, REGEN-COV can be given in the form of subcutaneous injections. If you are receiving subcutaneous injections, your dose will be provided as multiple injections given in separate locations around the same time.  Post-exposure prevention: If you are receiving subcutaneous injections, your dose will be provided as multiple injections given in separate locations around the same time. If you are receiving an intravenous infusion, the infusion will take 20 to 50 minutes or longer. o After the initial dose, if your healthcare provider determines that you need to receive additional doses of REGEN-COV for ongoing protection, the additional intravenous or subcutaneous doses would be administered monthly. WHAT ARE THE IMPORTANT POSSIBLE SIDE EFFECTS OF REGEN-COV (casirivimab and imdevimab)? Possible side effects of REGEN-COV are:    Allergic reactions. Allergic reactions can happen during and after infusion or injection of REGEN-COV. Tell your healthcare provider right away or seek immediate medical attention if you get any of the following signs and symptoms of allergic reactions: fever, chills, nausea, headache, shortness of breath, low or high blood pressure, rapid or slow heart rate, chest discomfort or pain, weakness, confusion, feeling tired, wheezing, swelling of your lips, face, or throat, rash including hives, itching, muscle aches, feeling faint, dizziness and sweating. These reactions may be severe or life threatening.  Worsening symptoms after treatment: You may experience new or worsening symptoms after infusion or injection, including fever, difficulty breathing, rapid or slow heart rate, tiredness, weakness or confusion. If these symptoms occur, contact your healthcare provider or seek immediate medical attention as some of these symptoms have required hospitalization.  It is unknown if these symptoms are related to treatment or are due to the progression of COVID-19. The side effects of getting any medicine by vein may include brief pain, bleeding, bruising of the skin, soreness, swelling, and possible infection at the infusion site. The side effects of getting any medicine by subcutaneous injection may include pain, bruising of the skin, soreness, swelling, and possible infection at the injection site. These are not all the possible side effects of REGEN-COV. Not a lot of people have been given REGEN-COV. Serious and unexpected side effects may happen. REGEN-COV is still being studied so it is possible that all of the risks are not known at this time. It is possible that REGEN-COV could interfere with your body's own ability to fight off a future infection of SARS-CoV-2. Similarly, REGEN-COV may reduce your body's immune response to a vaccine for SARS-CoV-2. Specific studies have not been conducted to address these possible risks. Talk to your healthcare provider if you have any questions. WHAT OTHER TREATMENT CHOICES ARE THERE? Like REGEN-COV (casirivimab and imdevimab), FDA may allow for the emergency use of other medicines to treat people with COVID-19. Go to TalkFail.se for information on the emergency use of other medicines that are not approved by FDA that are used to treat people with COVID-19. Your healthcare provider may talk with you about clinical trials you may be eligible for. It is your choice to be treated or not to be treated with REGEN-COV. Should you decide not to receive REGEN-COV or stop it at any time, it will not change your standard medical care. WHAT OTHER PREVENTION CHOICES ARE THERE? Vaccines to prevent COVID-19 are also available under Emergency Use Authorization. Use of REGEN-COV does not replace vaccination against COVID-19.  REGEN-COV is not authorized for pre-exposure prophylaxis for prevention of COVID-19. WHAT IF I AM PREGNANT OR BREASTFEEDING? There is limited experience using REGEN-COV (casirivimab and imdevimab) in pregnant women or breastfeeding mothers. For a mother and unborn baby, the benefit of receiving REGEN-COV may be greater than the risk of using the product. If you are pregnant or breastfeeding, discuss your options and specific situation with your healthcare provider. HOW DO I REPORT SIDE EFFECTSWITH REGEN-COV (casirivimab and imdevimab)? Tell your healthcare provider right away if you have any side effect that bothers you or does not go away. Report side effects to FDA MedWatch at www.fda.gov/medwatch or call 6-748-FIV-3727 or call 9-648.355.8662. HOW CAN I LEARN MORE?      Ask your health care provider.  Visit www. FreeCharge    Visit [email or web address].info/    Contact your local or state public health department. WHAT IS AN EMERGENCY USE AUTHORIZATION (EUA)? The [place] FDA has made REGEN-COV (casirivimab and imdevimab) available under an emergency access mechanism called an EUA. The EUA is supported by a [place] of Health and Human Service (HHS) declaration that circumstances exist to justify the emergency use of drugs and biological products during the COVID-19 pandemic. REGEN-COV has not undergone the same type of review as an FDA-approved product.  In issuing an EUA under the WXTZE-64 public health emergency, the FDA must determine, among other things, that based on the totality of scientific evidence available, it is reasonable to believe that the product may be effective for diagnosing, treating, or preventing COVID-19, or a serious or life-threatening disease or condition caused by COVID-19; that the known and potential benefits of the product, when used to diagnose, treat, or prevent such disease or condition, outweigh the known and potential risks of such product; and that there are no adequate,

## 2021-09-29 NOTE — TELEPHONE ENCOUNTER
Took an at home test yesterday and tested positive for COVID. States her boss spoke to ES personally and he was told to have the pt call the office if she is interested in having the Regeneron infusion (has an autoimmune condition). Pt is interested in getting it done at [facility]. Also wants to know if there are any other recommendations for COVID positive patients. Symptom onset was on 9/27/21. Please advise, call pt back at [phone].

## 2021-09-30 ENCOUNTER — HOSPITAL ENCOUNTER (OUTPATIENT)
Dept: NURSING | Age: 41
Discharge: HOME | End: 2021-09-30
Payer: COMMERCIAL

## 2021-09-30 ENCOUNTER — VIRTUAL VISIT (OUTPATIENT)
Dept: FAMILY MEDICINE CLINIC | Age: 41
End: 2021-09-30
Payer: COMMERCIAL

## 2021-09-30 VITALS
HEART RATE: 72 BPM | SYSTOLIC BLOOD PRESSURE: 120 MMHG | RESPIRATION RATE: 16 BRPM | DIASTOLIC BLOOD PRESSURE: 77 MMHG | TEMPERATURE: 96.7 F | OXYGEN SATURATION: 99 %

## 2021-09-30 DIAGNOSIS — Z00.00 LABORATORY EXAM ORDERED AS PART OF ROUTINE GENERAL MEDICAL EXAMINATION: ICD-10-CM

## 2021-09-30 DIAGNOSIS — J30.9 ALLERGIC RHINITIS, UNSPECIFIED SEASONALITY, UNSPECIFIED TRIGGER: ICD-10-CM

## 2021-09-30 DIAGNOSIS — J45.20 MILD INTERMITTENT ASTHMA WITHOUT COMPLICATION: ICD-10-CM

## 2021-09-30 DIAGNOSIS — Z13.220 LIPID SCREENING: ICD-10-CM

## 2021-09-30 DIAGNOSIS — K21.9 GASTROESOPHAGEAL REFLUX DISEASE, UNSPECIFIED WHETHER ESOPHAGITIS PRESENT: ICD-10-CM

## 2021-09-30 DIAGNOSIS — J33.9 NASAL POLYPS: ICD-10-CM

## 2021-09-30 DIAGNOSIS — D72.18 CHURG-STRAUSS SYNDROME (HCC): ICD-10-CM

## 2021-09-30 DIAGNOSIS — M30.1 CHURG-STRAUSS SYNDROME (HCC): ICD-10-CM

## 2021-09-30 DIAGNOSIS — U07.1 COVID-19: Primary | ICD-10-CM

## 2021-09-30 DIAGNOSIS — Z13.1 SCREENING FOR DIABETES MELLITUS: ICD-10-CM

## 2021-09-30 DIAGNOSIS — E66.9 CLASS 1 OBESITY WITHOUT SERIOUS COMORBIDITY WITH BODY MASS INDEX (BMI) OF 32.0 TO 32.9 IN ADULT, UNSPECIFIED OBESITY TYPE: ICD-10-CM

## 2021-09-30 DIAGNOSIS — Z00.00 WELL ADULT EXAM: Primary | ICD-10-CM

## 2021-09-30 PROCEDURE — 2580000003 HC RX 258: Performed by: FAMILY MEDICINE

## 2021-09-30 PROCEDURE — 99396 PREV VISIT EST AGE 40-64: CPT | Performed by: FAMILY MEDICINE

## 2021-09-30 PROCEDURE — 96365 THER/PROPH/DIAG IV INF INIT: CPT

## 2021-09-30 PROCEDURE — M0240 HC IV INFUSION CASIRIVIMAB AND IMDEVIMAB W/MONITORING SUBSEQUENT: HCPCS

## 2021-09-30 PROCEDURE — 6360000002 HC RX W HCPCS: Performed by: FAMILY MEDICINE

## 2021-09-30 RX ORDER — SODIUM CHLORIDE 9 MG/ML
25 INJECTION, SOLUTION INTRAVENOUS PRN
OUTPATIENT
Start: 2021-09-30

## 2021-09-30 RX ORDER — SODIUM CHLORIDE 9 MG/ML
INJECTION, SOLUTION INTRAVENOUS CONTINUOUS
Status: DISCONTINUED | OUTPATIENT
Start: 2021-09-30 | End: 2021-10-01 | Stop reason: HOSPADM

## 2021-09-30 RX ORDER — SODIUM CHLORIDE 9 MG/ML
INJECTION, SOLUTION INTRAVENOUS CONTINUOUS
OUTPATIENT
Start: 2021-09-30

## 2021-09-30 RX ORDER — SODIUM CHLORIDE 9 MG/ML
INJECTION, SOLUTION INTRAVENOUS CONTINUOUS
Status: CANCELLED | OUTPATIENT
Start: 2021-09-30

## 2021-09-30 RX ORDER — DIPHENHYDRAMINE HYDROCHLORIDE 50 MG/ML
50 INJECTION INTRAMUSCULAR; INTRAVENOUS ONCE
OUTPATIENT
Start: 2021-09-30 | End: 2021-09-30

## 2021-09-30 RX ORDER — METHYLPREDNISOLONE SODIUM SUCCINATE 125 MG/2ML
125 INJECTION, POWDER, LYOPHILIZED, FOR SOLUTION INTRAMUSCULAR; INTRAVENOUS ONCE
OUTPATIENT
Start: 2021-09-30 | End: 2021-09-30

## 2021-09-30 RX ORDER — HEPARIN SODIUM (PORCINE) LOCK FLUSH IV SOLN 100 UNIT/ML 100 UNIT/ML
500 SOLUTION INTRAVENOUS PRN
OUTPATIENT
Start: 2021-09-30

## 2021-09-30 RX ORDER — SODIUM CHLORIDE 0.9 % (FLUSH) 0.9 %
5-40 SYRINGE (ML) INJECTION PRN
OUTPATIENT
Start: 2021-09-30

## 2021-09-30 RX ORDER — OMEPRAZOLE 20 MG/1
20 CAPSULE, DELAYED RELEASE ORAL DAILY
Qty: 90 CAPSULE | Refills: 3 | Status: SHIPPED | OUTPATIENT
Start: 2021-09-30 | End: 2022-10-03

## 2021-09-30 RX ORDER — PREDNISONE 1 MG/1
1 TABLET ORAL DAILY
COMMUNITY
End: 2021-10-12 | Stop reason: SDUPTHER

## 2021-09-30 RX ADMIN — CASIRIVIMAB AND IMDEVIMAB: 600; 600 INJECTION, SOLUTION, CONCENTRATE INTRAVENOUS at 14:07

## 2021-09-30 RX ADMIN — SODIUM CHLORIDE: 9 INJECTION, SOLUTION INTRAVENOUS at 14:07

## 2021-09-30 ASSESSMENT — PAIN SCALES - GENERAL
PAINLEVEL_OUTOF10: 0

## 2021-09-30 ASSESSMENT — ENCOUNTER SYMPTOMS
CHEST TIGHTNESS: 0
CONSTIPATION: 0
DIARRHEA: 0
NAUSEA: 0
ABDOMINAL PAIN: 0
BLOOD IN STOOL: 0
VOMITING: 0
SHORTNESS OF BREATH: 0
ANAL BLEEDING: 0

## 2021-09-30 ASSESSMENT — PAIN - FUNCTIONAL ASSESSMENT: PAIN_FUNCTIONAL_ASSESSMENT: 0-10

## 2021-09-30 NOTE — PATIENT INSTRUCTIONS
Follow up with Dr. Kendra Gilliland, Dr. Maria Alejandra Najera ([address] [facility]), Dr. Hugo Frias (Allergy/ Asthma), and Dr. Esteban Grant ([facility]) JM scheduled/ as needed. (updated 9/30/2021)  Continue to work on diet, exercise, and weight loss for optimal cardiovascular health. Continue current medicines. Refills   Await recent TFT's per pt.     Await monthly CBC, CMP, UA, ESR, and CRP per Dr. Sergei Melissa at this time   Follow up in 12 months.             Preventive Health Topics:  Pt declines HEP C screening at this time due to lack of risk factors. Proceed with Regeneron as planned today. Encouraged annual FLU VACCINE- pt to get per Employer. PAP/PELVIC management per Dr. Enid Francois- last appt 7/2021- to follow up annuallyplease get in touch with their office if you are interested in starting estrogen as discussed with Dr. Enid Francois. (updated 9/30/2021)  MAMMO to be done after 9/28/2022  DEXA management per Dr. Elizabet Sanz at [facility] (done at [address] to persistent steroid use- done most recently 9/2020.   Pt to drop copy off to office. (updated 9/30/2021)

## 2021-09-30 NOTE — PROGRESS NOTES
1402  Patient arrived to \[facility]\"" AMBULATORY for regeneron infusion. Oriented to room and call light  PT RIGHTS AND RESPONSIBILITIES OFFERED TO PT.    1407 Medication started and she denies complaints    1428 infusion completed and she denies complaints    1528  Patient denies complaints. Discharge instructions given and explained and he denies questions. Discharged ambulatory.           _M___ Safety:       (Environmental)   [place] to environment   Ensure ID band is correct and in place/ allergy band as needed   Assess for fall risk   Initiate fall precautions as applicable (fall band, side rails, etc.)   Call light within reach   Bed in low position/ wheels locked    _M___ Pain:        Assess pain level and characteristics   Administer analgesics as ordered   Assess effectiveness of pain management and report to MD as needed    __M__ Knowledge Deficit:   Assess baseline knowledge   Provide teaching at level of understanding   Provide teaching via preferred learning method   Evaluate teaching effectiveness    __M__ Hemodynamic/Respiratory Status:       (Pre and Post Procedure Monitoring)   Assess/Monitor vital signs and LOC   Assess Baseline SpO2 prior to any sedation   Obtain weight/height   Assess vital signs/ LOC until patient meets discharge criteria   Monitor procedure site and notify MD of any issues

## 2021-10-01 ENCOUNTER — PATIENT MESSAGE (OUTPATIENT)
Dept: FAMILY MEDICINE CLINIC | Age: 41
End: 2021-10-01

## 2021-10-01 NOTE — TELEPHONE ENCOUNTER
From: Twila Paige  To: Dina Bradley MD  Sent: 10/1/2021 3:32 PM EDT  Subject: Visit Anamaria Beck. You had wanted me to let you know how the infusion went. Things went well and I am feeling less tightness in my chest now and less body aches. So I am hoping things will just keep improving. I also sent all the labs to your office and CT scans. I put a note on there to remind you about ordering a repeat CT scan of the lungs due to the nodules seen as you suggested. Thanks again for everything and for helping me get the infusion so quickly.    Take care,   Morteza

## 2021-10-02 NOTE — TELEPHONE ENCOUNTER
Please call patient-I do not see results of CT scan that patient is referencing. Please obtain results and let me know.   ES

## 2021-10-05 ENCOUNTER — TELEPHONE (OUTPATIENT)
Dept: FAMILY MEDICINE CLINIC | Age: 41
End: 2021-10-05

## 2021-10-05 NOTE — TELEPHONE ENCOUNTER
Spoke to pt and notified her of ES recommendations and verbalized understanding. She will call back in January 2022 to have the test ordered and scheduled at [facility]. Pre-cert will be needed.

## 2021-10-05 NOTE — TELEPHONE ENCOUNTER
COPIED Jaime 62    Call patient-  CT reviewed  Check CT chest in February 2022 for 6-month follow-up of 2 pleural-based lung nodules.  ES

## 2021-10-06 ENCOUNTER — TELEPHONE (OUTPATIENT)
Dept: FAMILY MEDICINE CLINIC | Age: 41
End: 2021-10-06

## 2021-10-06 NOTE — TELEPHONE ENCOUNTER
Pt is due to go back to work on Friday from covid, Pt states her quarantine is done tomorrow. Pt says she will need a RTW Slip stating she is ok to go back to work. WPU Thursday Afternoon     Ok to leave message if needed.

## 2021-10-06 NOTE — LETTER
[address] [id number]  Phone: [phone]  Fax: [phone]    Sheela Quintero MD        October 6, 2021     Patient: Janet Vargas   YOB: 1980       To Whom It May Concern: It is my medical opinion that Edgar Jaimes may return to work without restriction on 10/8/21. She was absent from work due to testing positive for COVID and had to isolate/quarantine. If you have any questions or concerns, please don't hesitate to call.     Sincerely,        Sheela Quintero MD

## 2021-10-12 ENCOUNTER — OFFICE VISIT (OUTPATIENT)
Dept: RHEUMATOLOGY | Age: 41
End: 2021-10-12
Payer: COMMERCIAL

## 2021-10-12 VITALS
SYSTOLIC BLOOD PRESSURE: 102 MMHG | OXYGEN SATURATION: 97 % | BODY MASS INDEX: 32.84 KG/M2 | HEART RATE: 85 BPM | DIASTOLIC BLOOD PRESSURE: 60 MMHG | HEIGHT: 66 IN | WEIGHT: 204.37 LBS

## 2021-10-12 DIAGNOSIS — J34.89 NASAL SORE: ICD-10-CM

## 2021-10-12 DIAGNOSIS — M30.1 CHURG-STRAUSS SYNDROME (HCC): Primary | ICD-10-CM

## 2021-10-12 DIAGNOSIS — D72.18 CHURG-STRAUSS SYNDROME (HCC): Primary | ICD-10-CM

## 2021-10-12 DIAGNOSIS — Z79.52 CURRENT CHRONIC USE OF SYSTEMIC STEROIDS: ICD-10-CM

## 2021-10-12 DIAGNOSIS — Z51.81 MEDICATION MONITORING ENCOUNTER: ICD-10-CM

## 2021-10-12 PROCEDURE — G8427 DOCREV CUR MEDS BY ELIG CLIN: HCPCS | Performed by: INTERNAL MEDICINE

## 2021-10-12 PROCEDURE — 1036F TOBACCO NON-USER: CPT | Performed by: INTERNAL MEDICINE

## 2021-10-12 PROCEDURE — G8417 CALC BMI ABV UP PARAM F/U: HCPCS | Performed by: INTERNAL MEDICINE

## 2021-10-12 PROCEDURE — G8484 FLU IMMUNIZE NO ADMIN: HCPCS | Performed by: INTERNAL MEDICINE

## 2021-10-12 PROCEDURE — 99214 OFFICE O/P EST MOD 30 MIN: CPT | Performed by: INTERNAL MEDICINE

## 2021-10-12 RX ORDER — PREDNISONE 1 MG/1
1 TABLET ORAL DAILY
Qty: 90 TABLET | Refills: 1 | Status: SHIPPED | OUTPATIENT
Start: 2021-10-12 | End: 2022-10-03 | Stop reason: SDUPTHER

## 2021-10-12 RX ORDER — PREDNISONE 1 MG/1
20 TABLET ORAL DAILY
Qty: 120 TABLET | Refills: 3 | Status: SHIPPED | OUTPATIENT
Start: 2021-10-12 | End: 2022-10-03 | Stop reason: SDUPTHER

## 2021-10-12 ASSESSMENT — ENCOUNTER SYMPTOMS
COUGH: 0
WHEEZING: 1
NAUSEA: 0
EYE REDNESS: 0
DIARRHEA: 0
VOMITING: 0
EYE PAIN: 0
SHORTNESS OF BREATH: 1
EYES NEGATIVE: 1
CONSTIPATION: 0

## 2021-10-12 NOTE — PROGRESS NOTES
[facility] RHEUMATOLOGY FOLLOW UP NOTE       Date Of Service: 10/12/2021  Provider: Gloria Almendarez DO, DO    Name: Jose Valencia   MRN: [id number]    Chief Complaint(s)     Chief Complaint   Patient presents with    Follow-up     1 year Churg- Keyana Mailman Syndrome         History of Present Illness (HPI)     Jose Valencia  is a(n)41 y.o. female with a hx ofchurg-Connie syndrome, Chronic sinusitis, GERD, Mild intermittent Asthma, allergic rhinitis here for the f/u  evaluation of her Churg-Connie, chronic prednisone use .     Per pt and chart review she had a long standing h/o allergies and asthma, that worsened around 2002. In 2006 she began having progressive sxs including sinonasal symptoms,  Surgery in 10/2007 with septal deviation and polyp removal. Severe recurrent asthma, increasing eosinophilia up to 55%, severe arthralgias w/o swelling, fatigue, numbness in bilateral feet, and skin rash (legs and forearms) with bx revealing pervsacular dermatitis with prominent eosinophils. Stablized with prednisone during 2008 pregnancy. Started seeing Dr. Mitali Montalvo in 2008 and diagnosed with Jovani Greenfield (EGPA). She had Recurrence of rash in 2009 on b/l lower extremities that resolved with prednisone but returned with tapering.      Initialy evaluated at [facility] 8/18/09 at which time Azathioprine was discussed which she started on 9/09. Valerie Maire Sxs improved and prednisone was taper w/o recurrence of rash. She wanted to pursue another child so the imuran was tapered and discontinued on 2/12. She conceived and her pregnancy went well and was far less complicated than her first pregnancy. Daughter deliver on 12/12. Increased sinonasal sxs and asthmatic features with peripheral einophila increased around her 7/13 evaluation. Imuran restarted. In 3/2016 Mepolizumab 100mg/victor hugo was started and her asthma significantly improved, w/ improvement of the PFT, Exertional SOB. Pt weaned off Imuran on 9/16.    Prednisone down to 7mg daily 1/17. continued recurrent sinusitis from nasal polyps but her asthma and vasculitis were controlled. SEEING Allergist and ENT currently.       Interval hx.   --  Starting nov 2020 - w/ 5-7 episodes. Generalized tingling sensation - wheezing / sob , abd cramping, hot sensation light headedness, occasional hives. Normal pulse ox , previously with mildly low BP. - improvement w/ benadryl, inhaler within 30 min. Having some trembling sensation prior to resolution. -- allergic type reaction - neg evaluation for carcinoid tumor. Allergy testing (mild mild allergy) - currently evaluation for histamine specialist evaluation. ? Relation to the underlying churg flor. CT imaging with pulmonary nodules and repeat imaging ordered. covid infection 2 weeks ago - s/p regeneron w/ improvement of symptoms. Churg crystal: -Bactrim removed with some mild worsening of the sinus sx's no improvement of the allergic type symptoms. Continued sinus congestion with recent covid. ? Increased nasal polyps. sinus surgery - in office Nov 2019 w/ improvement of congestion. - 1 sinus infection since the last evaluation. Mild seasonal congestion of ears and sinus , mild postnalsal drip. Current therapy:   - Levocetirizine 5mg   - Mepolizumab 300mg (sept 2018 from 100mg qd since [id number])  - Prednisone 6mg daily   - Bactrim DS M/W/F -- Dr. Pippa Judge: (ROS)    Review of Systems   Constitutional: Negative for diaphoresis, fatigue and fever. HENT: Positive for congestion. Negative for hearing loss and nosebleeds. Eyes: Negative. Negative for pain and redness. Respiratory: Positive for shortness of breath and wheezing. Negative for cough. Cardiovascular: Negative. Negative for chest pain. Gastrointestinal: Negative for constipation, diarrhea, nausea and vomiting. Genitourinary: Negative for difficulty urinating, frequency and hematuria.    Musculoskeletal: Negative for myalgias. Skin: Positive for rash (hives). Neurological: Negative for dizziness, weakness and headaches. Hematological: Does not bruise/bleed easily. Psychiatric/Behavioral: Negative for sleep disturbance. The patient is not nervous/anxious. PAST MEDICAL HISTORY     has a past medical history of Allergic rhinitis, Asthma, Churg-Connie syndrome with lung involvement ([address] Complication of anesthesia, and SVT (supraventricular tachycardia) ([facility] [address] PAST SURGICAL HISTORY     has a past surgical history that includes sinus surgery (); Tonsillectomy and adenoidectomy;  section (2008); sinus surgery (2014); Tympanostomy tube placement (Left, 3/15, 10/15); Tympanostomy tube placement (Left); ERCP (N/A, 3/20/2019); ERCP (3/20/2019); Cholecystectomy, laparoscopic (N/A, 3/22/2019); and sinus surgery (Bilateral, 2019). [facility] Manzano FAMILY HISTORY      Family History   Problem Relation Age of Onset    High Blood Pressure Mother     Arthritis Mother     Cancer Maternal Grandmother         Breast cancer    Arthritis Maternal Grandmother     Arthritis Maternal Grandfather     Alzheimer's Disease Maternal Grandfather     Alzheimer's Disease Paternal Grandmother        SOCIAL HISTORY     reports that she has never smoked. She has never used smokeless tobacco. She reports that she does not drink alcohol and does not use drugs.     ALLERGIES     Allergies   Allergen Reactions    Biaxin [Clarithromycin] Hives    Ceclor [Cefaclor] Hives       CURRENT MEDICATIONS      Current Outpatient Medications:     predniSONE (DELTASONE) 1 MG tablet, Take 1 mg by mouth daily, Disp: , Rfl:     omeprazole (PRILOSEC) 20 MG delayed release capsule, Take 1 capsule by mouth Daily, Disp: 90 capsule, Rfl: 3    predniSONE (DELTASONE) 5 MG tablet, Take 4 tablets by mouth daily (Patient taking differently: Take 5 mg by mouth daily ), Disp: 120 tablet, Rfl: 3    Fluticasone Propionate (Cici Bello) 93 MCG/ACT EXHU, by Nasal route 2 times daily, Disp: , Rfl:     Multiple Vitamin (MULTI-VITAMIN DAILY PO), Take by mouth, Disp: , Rfl:     CALCIUM PO, Take by mouth, Disp: , Rfl:     mepolizumab (NUCALA) 100 MG SOLR injection, Once monthly, Disp: , Rfl:     SPRINTEC 28 0.25-35 MG-MCG per tablet, Take by mouth daily, Disp: , Rfl:     levocetirizine (XYZAL) 5 MG tablet, Take 5 mg by mouth as needed, Disp: , Rfl:     albuterol (PROVENTIL HFA;VENTOLIN HFA) 108 (90 BASE) MCG/ACT inhaler, Inhale 2 puffs into the lungs every 6 hours as needed for Wheezing or Shortness of Breath, Disp: , Rfl:     budesonide-formoterol (SYMBICORT) 160-4.5 MCG/ACT AERO, Inhale 2 puffs into the lungs 2 times daily. , Disp: , Rfl:     PHYSICAL EXAMINATION / OBJECTIVE     Objective:  /60 (Site: Left Upper Arm, Position: Sitting, Cuff Size: Large Adult)   Pulse 85   Ht 5' 6.5\" (1.689 m)   Wt 204 lb 5.9 oz (92.7 kg)   SpO2 97%   BMI 32.50 kg/m²     General Appearance: General appearance:  AAO x 3 ,  well-developed and well nourished  Head: normocephalic and atraumatic  Eyes: No gross abnormalities. ,  Sclera non-icteric  Ears / nose:  normal external appearance of left and right ear and nose. No active drainage from nose. mouth:  MMM, ears w/o deformities  Neck: No jugular venous distention, appears symmetric, good ROM  Lymph:  No adenopathy   Pulmonary/Chest: CTA bilateral ,  symmetric chest expansion. Cardiovascular: Normal S1 and S2, NO murmur, rub, gallop  : Deferred   Abd/GI: Deferred   Neurologic:  speech normal,   no facial drooping, no asterixis  Skin:  No rash on exposed extremities. Extremities:   Musculoskeletal:   Non-tender, no swelling       Exam KEY:   Tender :  T    Swelling: S,   Deformities: D,   Non-tender : NT  ,  No swelling: NS       RAPID 3:   10/12/2021 --- RAPID 3:  +  +  =        Remission: <3  Low Disease Activity: <6  Moderate Disease Activity: >=6 and <=12  High Disease Activity: >12     LABS      CBC  Lab Results   Component Value Date    WBC 7.6 03/22/2019    WBC 10.4 03/21/2019    WBC 6.3 03/20/2019    RBC 4.13 03/22/2019    HGB 12.6 03/22/2019    HGB 12.3 03/21/2019    HGB 13.2 03/20/2019    HCT 39.1 03/22/2019    MCV 94.7 03/22/2019    RDW 13.1 12/16/2012     03/22/2019     03/21/2019     03/20/2019       CMP  Lab Results   Component Value Date    CALCIUM 8.6 03/22/2019    LABALBU 3.1 03/22/2019    PROT 6.1 03/22/2019     03/22/2019    K 5.0 03/22/2019    K 4.1 03/20/2019    CO2 29 03/22/2019     03/22/2019    BUN 6 03/22/2019    CREATININE 0.5 03/22/2019    CREATININE 0.4 03/21/2019    CREATININE 0.3 03/20/2019    ALKPHOS 166 03/22/2019     03/22/2019     03/21/2019     03/20/2019    AST 80 03/22/2019     03/21/2019     03/20/2019       HgBA1c: No components found for: HGBA1C    No results found for: VITD25    No results found for: ANASCRN  No results found for: SSA  No results found for: SSB  No results found for: ANTI-SMITH  No results found for: DSDNAAB   No results found for: ANTIRNP  No results found for: C3, C4  No results found for: CCPAB  No results found for: RF    No components found for: CANCASCRN, APANCASCRN  No results found for: SEDRATE  No results found for: CRP      RADIOLOGY / PROCEDURES:         ASSESSMENT/PLAN    Assessment   Plan     1. Churg-Connie syndrome ([address]    2. Current chronic use of systemic steroids    3. Nasal sore    4. Medication monitoring encounter          Churg-connie syndrome  - dx'ed 5/2009: prolonged h/o allergies, recurrent asthma. Increased sinonasal sx's aroudn 2006. 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 (+) skin rash on bilateral legs, and Rt arm w/ biopsy revealing perivascular dermatitis w/ prominent eosinophils. Paresthesias resoled after therapy started.  Negative ANCA's in 2009.    - Prior tx AZA (9/09-2/12,7/13-9/16),Mepolizumab 100mg qd (3/16-Aug/2018)    - followed and Treated by Dr. Abrahan Keith at [facility] since 2009.                  Current therapy                - Mepolizumab 300mg q month (sept 2018)                - Prednisone currently 6 mg daily (5/09-present) - worsened sx's around seaons changes @ 5mg qd               - currently off Bactrim DS M/W/F   - discussed medication titration w/ addition of AZATHIOPRINE or Methotrexate if there is no evidence of histamine issue.      Hives/wheezing - intermittent w/ associated wheezing - currently awaiting evaluation by a histamine specialist at [facility] [place]. - ? EGPA vs other. Pulmonary nodules - re-evaluation ordered by PCP. --- request from [facility]    Chronic systemic steroids. - prednisone 6mg daily with intermittent burst depending upon sinus issues. - calcium  < 1500mg day of supplementation, and vitamin D supplementation.    - DXA normal in 2016   - REQUEST DEXA FROM [facility] - performed Sept 2020.      Medication monitoring: - CBC, CMP, ESR, CRP, U/A -- labs from [facility] stable. Return in about 6 months (around 4/12/2022). Electronically signed by Keyanna Lozano DO on 10/12/2021 at 7:44 AM    New Prescriptions    No medications on file       Thank you for allowing me to participate in the care of this patient. Please call if there are any questions.

## 2021-10-28 ENCOUNTER — TELEPHONE (OUTPATIENT)
Dept: FAMILY MEDICINE CLINIC | Age: 41
End: 2021-10-28

## 2021-10-28 NOTE — LETTER
[address] [address]  Phone: [phone]  Fax: [phone]    Zaina Chen MD        October 28, 2021     Patient: Ruperto Chavez   YOB: 1980           To Whom It May Concern:    Due to the current guidelines for any patient who has received monoclonal antibody treatment or convalescent plasma for COVID, the above patient should not receive the COVID vaccine until after 12/30/2021. If you have any questions or concerns, please don't hesitate to call.     Sincerely,          Zaina Chen MD

## 2021-10-28 NOTE — TELEPHONE ENCOUNTER
Letter on ES's desk for signature. [facility] next week. Detailed message left for pt letting her know that letter would be ready then.

## 2021-10-28 NOTE — TELEPHONE ENCOUNTER
Patient received Regeneron on 9/30/21. She anticipates that her employer is going to require her to get the COVID vaccine. She is requesting a letter stating that she will need to wait 90 days after the infusion to get this. [phone] [address]affney for letter? [facility] next week.

## 2021-11-18 ENCOUNTER — TELEPHONE (OUTPATIENT)
Dept: FAMILY MEDICINE CLINIC | Age: 41
End: 2021-11-18

## 2021-11-18 DIAGNOSIS — Z78.9 UNKNOWN STATUS OF IMMUNITY TO COVID-19 VIRUS: Primary | ICD-10-CM

## 2021-11-18 NOTE — TELEPHONE ENCOUNTER
Okay to order, however I would not recommend as an antibody test really is not indicative of future protection as we do not know what level of antibodies is sufficient to protect against further infection. However, if patient adamant, okay for antibodies at this time.   ES

## 2021-11-18 NOTE — TELEPHONE ENCOUNTER
Pt wants to know if COVID antibody testing can be ordered. She said that it has been about 6 weeks since she has had COVID and she wants to see if she built up the antibodies.

## 2022-01-27 ENCOUNTER — TELEPHONE (OUTPATIENT)
Dept: FAMILY MEDICINE CLINIC | Age: 42
End: 2022-01-27

## 2022-01-27 DIAGNOSIS — R91.1 NODULE OF LOWER LOBE OF RIGHT LUNG: Primary | ICD-10-CM

## 2022-01-27 NOTE — TELEPHONE ENCOUNTER
Pt is calling in asking for you to order the repeat lung ct scan. Pt had one done last summer @ [address] [place] [address], pt stated there was some small nodules you wanted to watch. Pt wants to do the ct at [facility] if possible. Pt stated her last one needed a per cert so she is assuming this one will too. Pt has medical mutual and [employer] ins and states they are current in her chart  Pt can set own appt if you get her the approval number.      cpb

## 2022-01-27 NOTE — TELEPHONE ENCOUNTER
Patient notified that our office will be working on obtaining the precert over the next several days. If approval obtained, patient would like notified by phone and have the order faxed to [facility]--please include auth # on order.

## 2022-01-28 NOTE — TELEPHONE ENCOUNTER
Spoke with Sury Christianson at [place] to start prior authorization. She requested the NPI of [facility]. I provided a generic NPI ([id number]) found on a web search and was given an approval # of [id number], valid for 1 date of service from 1/28/22-3/14/22. Was instructed to call [employer] back with updated NPI after confirming with [facility]. Detailed message left with Yadi Chadwick at [facility] requesting this info. Await callback. Would like to make sure all info is accurate before providing to patient.

## 2022-01-31 NOTE — TELEPHONE ENCOUNTER
Confirmed NPI# with [facility]. Order printed. Patient will  order tomorrow and schedule her own study at [facility].

## 2022-02-08 ENCOUNTER — TELEPHONE (OUTPATIENT)
Dept: FAMILY MEDICINE CLINIC | Age: 42
End: 2022-02-08

## 2022-02-08 NOTE — TELEPHONE ENCOUNTER
----- Message from Orien Card sent at 2/8/2022  4:29 PM EST -----  Subject: Appointment Request    Reason for Call: Routine Physical Exam    QUESTIONS  Type of Appointment? Established Patient  Reason for appointment request? No appointments available during search  Additional Information for Provider? Patient trying to reschedule her   10/03/2022 appt with Dr. Veronica Goss at his new location. She is trying to   follow instructions as per [facility] and we are not able to schedule her. Please call to discuss rescheduling with Dr. Veronica Goss.  ---------------------------------------------------------------------------  --------------  Cachorro Danger INFO  What is the best way for the office to contact you? OK to leave message on   voicemail  Preferred Call Back Phone Number? [phone]  ---------------------------------------------------------------------------  --------------  SCRIPT ANSWERS  Relationship to Patient? Self  Have your symptoms changed? No  (If the patient has Medicare as their primary insurance coverage ask this   question) Are you requesting a Medicare Annual Wellness Visit? No  (Is the patient requesting a pap smear with their physical exam?)? No  (Is the patient requesting their annual physical and does not need PAP or   AWV per above?)? Yes   Have you been diagnosed with, awaiting test results for, or told that you   are suspected of having COVID-19 (Coronavirus)? (If patient has tested   negative or was tested as a requirement for work, school, or travel and   not based on symptoms, answer no)? No  Within the past two weeks have you developed any of the following symptoms   (answer no if symptoms have been present longer than 2 weeks or began   more than 2 weeks ago)?  Fever or Chills, Cough, Shortness of breath or   difficulty breathing, Loss of taste or smell, Sore throat, Nasal   congestion, Sneezing or runny nose, Fatigue or generalized body aches   (answer no if pain is specific to a body part e.g. back pain), Diarrhea,   Headache? No  Have you had close contact with someone with COVID-19 in the last 14 days? No  (Service Expert  click yes below to proceed with [employer] As Usual   Scheduling)?  Yes

## 2022-02-08 NOTE — TELEPHONE ENCOUNTER
----- Message from Harsha Cha sent at 2/8/2022  2:21 PM EST -----  Subject: Appointment Request    Reason for Call: Routine Physical Exam    QUESTIONS  Type of Appointment? Established Patient  Reason for appointment request? No appointments available during search  Additional Information for Provider? pt. only wants to see pcp Darlene Garcia and indicates she was sent a message stating he will be seeing   pt.'s on Thursdays. . no appointments available  ---------------------------------------------------------------------------  --------------  CALL BACK INFO  What is the best way for the office to contact you? OK to leave message on   [employer], OK to respond with electronic message via [employer] portal (only   for patients who have registered [employer] account)  Preferred Call Back Phone Number? [phone]  ---------------------------------------------------------------------------  --------------  SCRIPT ANSWERS  Relationship to Patient? Self  Have your symptoms changed? No  (If the patient has Medicare as their primary insurance coverage ask this   question) Are you requesting a Medicare Annual Wellness Visit? No  (Is the patient requesting a pap smear with their physical exam?)? No  (Is the patient requesting their annual physical and does not need PAP or   AWV per above?)? Yes   Have you been diagnosed with, awaiting test results for, or told that you   are suspected of having COVID-19 (Coronavirus)? (If patient has tested   negative or was tested as a requirement for work, school, or travel and   not based on symptoms, answer no)? No  Within the past two weeks have you developed any of the following symptoms   (answer no if symptoms have been present longer than 2 weeks or began   more than 2 weeks ago)?  Fever or Chills, Cough, Shortness of breath or   difficulty breathing, Loss of taste or smell, Sore throat, Nasal   congestion, Sneezing or runny nose, Fatigue or generalized body aches   (answer no if pain is specific to a body part e.g. back pain), Diarrhea,   Headache? No  Have you had close contact with someone with COVID-19 in the last 14 days? No  (Service Expert  click yes below to proceed with [employer] As Usual   Scheduling)?  Yes

## 2022-03-08 ENCOUNTER — TELEPHONE (OUTPATIENT)
Dept: RHEUMATOLOGY | Age: 42
End: 2022-03-08

## 2022-03-08 NOTE — TELEPHONE ENCOUNTER
Patient was scheduled for a 6 mo f/u appt on 04/12/2022 with Dr. Michelle Alvarado. She needed to cancel this appt, but was wanting to know if there was a reason that he wanted to see her at 6 months rather than the normal annual appt? She said that he is her local rheumatologist and she follows with him as well as a dr at [address] [place] [address] and she sees them typically in early June. She has an annual appt scheduled with dr. Michelle Alvarado for 10/11/2022. Is it okay for her to wait to be seen then or is he also wanting her to be seen sooner as well? And then she will be following with her other dr around June.   Please advise

## 2022-03-29 ENCOUNTER — TELEPHONE (OUTPATIENT)
Dept: FAMILY MEDICINE CLINIC | Age: 42
End: 2022-03-29

## 2022-03-29 NOTE — TELEPHONE ENCOUNTER
Previous CT report reviewed:      Current CT report reviewed also:        Notify patient-everything appears stable at this time. No worrisome findings. Will consider recheck again in 12-24 months given stability over past 6-8 months.   ES

## 2022-03-29 NOTE — TELEPHONE ENCOUNTER
Patient calls to inquire about results of CT chest that was completed at [facility] back in February. States that she never received a callback from our office. Upon chart review, it looks like report was never forwarded. I contacted medical records at [facility] and requested report. Report scanned to chart. Please review and advise.   CPB

## 2022-06-28 ENCOUNTER — NURSE ONLY (OUTPATIENT)
Dept: LAB | Age: 42
End: 2022-06-28

## 2022-07-12 LAB — CYTOLOGY THIN PREP PAP: NORMAL

## 2022-10-01 DIAGNOSIS — K21.9 GASTROESOPHAGEAL REFLUX DISEASE, UNSPECIFIED WHETHER ESOPHAGITIS PRESENT: ICD-10-CM

## 2022-10-03 ENCOUNTER — TELEPHONE (OUTPATIENT)
Dept: RHEUMATOLOGY | Age: 42
End: 2022-10-03

## 2022-10-03 DIAGNOSIS — D72.18 CHURG-STRAUSS SYNDROME (HCC): ICD-10-CM

## 2022-10-03 DIAGNOSIS — M30.1 CHURG-STRAUSS SYNDROME (HCC): ICD-10-CM

## 2022-10-03 RX ORDER — PREDNISONE 1 MG/1
1 TABLET ORAL DAILY
Qty: 90 TABLET | Refills: 0 | Status: SHIPPED | OUTPATIENT
Start: 2022-10-03

## 2022-10-03 RX ORDER — OMEPRAZOLE 20 MG/1
CAPSULE, DELAYED RELEASE ORAL
Qty: 90 CAPSULE | Refills: 3 | Status: SHIPPED | OUTPATIENT
Start: 2022-10-03

## 2022-10-03 RX ORDER — PREDNISONE 1 MG/1
5 TABLET ORAL DAILY
Qty: 90 TABLET | Refills: 0 | Status: SHIPPED | OUTPATIENT
Start: 2022-10-03

## 2022-10-03 NOTE — TELEPHONE ENCOUNTER
Patient was last seen 10/12/2021 and her next appt is 10/11/2022 with Dr Frederick Lewis. She is unable to keep her 740 am appt on 10/11/2022, and next available with Dr Frederick Lewis is December. She is asking if she can see [facility] sooner? But she has never seen [facility] in the office, only Dr Frederick Lewis. Please call her back to advise.

## 2022-10-03 NOTE — TELEPHONE ENCOUNTER
Patient's last appointment was : 9/30/2021  Patient's next appointment is :   Future Appointments   Date Time Provider Mihaela Dong   10/11/2022  7:40 AM Cordell Mejia, DO N [facility] Rheum [facility] [place]   10/18/2022  8:20 AM Fili Segura DO [facility] FM RES [facility] [place]   8/24/2023  8:30 AM Mel Oscar MD [facility] FM RES [facility] [place]     Last refilled:9/30/21    Lab Results   Component Value Date    LABA1C 5.4 10/19/2018     Lab Results   Component Value Date    CHOL 154 05/05/2020    TRIG 69 05/05/2020    HDL 69 05/05/2020    LDLCALC 44 05/05/2020     Lab Results   Component Value Date     03/22/2019    K 5.0 03/22/2019     03/22/2019    CO2 29 03/22/2019    BUN 6 (L) 03/22/2019    CREATININE 0.5 03/22/2019    GLUCOSE 102 03/22/2019    CALCIUM 8.6 03/22/2019    PROT 6.1 03/22/2019    LABALBU 3.1 (L) 03/22/2019    BILITOT 1.1 03/22/2019    ALKPHOS 166 (H) 03/22/2019    AST 80 (H) 03/22/2019     (H) 03/22/2019    LABGLOM >90 03/22/2019     No results found for: TSH, O1PENHT, A9SJQBX, THYROIDAB, FT3, T4FREE  Lab Results   Component Value Date    WBC 7.6 03/22/2019    HGB 12.6 03/22/2019    HCT 39.1 03/22/2019    MCV 94.7 03/22/2019     03/22/2019

## 2022-10-04 ENCOUNTER — TELEPHONE (OUTPATIENT)
Dept: FAMILY MEDICINE CLINIC | Age: 42
End: 2022-10-04

## 2022-10-04 NOTE — TELEPHONE ENCOUNTER
Called and lm regarding ES response. Link Decent it can be discussed more in depth at her appt on 10/18/2022. If any questions she may call back.

## 2022-10-04 NOTE — TELEPHONE ENCOUNTER
Discussed case with radiology. Given there was no change on most recent CT scan of chest in February 2022, would recommend repeat CT scan in 1 year after previous. We will plan recheck CT in February 2023.   ES

## 2022-10-04 NOTE — TELEPHONE ENCOUNTER
MD Susanne Amaral called and states that she is wanting to know if you would like CT scan ordered prior to her appointment on 10/18/2022 due to previous CT scan of 03/29/2022 showing Nodule of Right lower lobe. Please Advise.

## 2022-10-18 ENCOUNTER — OFFICE VISIT (OUTPATIENT)
Dept: FAMILY MEDICINE CLINIC | Age: 42
End: 2022-10-18
Payer: COMMERCIAL

## 2022-10-18 VITALS
RESPIRATION RATE: 16 BRPM | HEIGHT: 68 IN | WEIGHT: 215.6 LBS | BODY MASS INDEX: 32.67 KG/M2 | HEART RATE: 73 BPM | OXYGEN SATURATION: 99 % | TEMPERATURE: 98.5 F

## 2022-10-18 DIAGNOSIS — E66.9 CLASS 1 OBESITY WITHOUT SERIOUS COMORBIDITY WITH BODY MASS INDEX (BMI) OF 32.0 TO 32.9 IN ADULT, UNSPECIFIED OBESITY TYPE: ICD-10-CM

## 2022-10-18 DIAGNOSIS — R91.8 PULMONARY NODULES: ICD-10-CM

## 2022-10-18 DIAGNOSIS — Z00.00 ENCOUNTER FOR WELL ADULT EXAM WITHOUT ABNORMAL FINDINGS: Primary | ICD-10-CM

## 2022-10-18 PROCEDURE — G8427 DOCREV CUR MEDS BY ELIG CLIN: HCPCS | Performed by: STUDENT IN AN ORGANIZED HEALTH CARE EDUCATION/TRAINING PROGRAM

## 2022-10-18 PROCEDURE — G8484 FLU IMMUNIZE NO ADMIN: HCPCS | Performed by: STUDENT IN AN ORGANIZED HEALTH CARE EDUCATION/TRAINING PROGRAM

## 2022-10-18 PROCEDURE — 1036F TOBACCO NON-USER: CPT | Performed by: STUDENT IN AN ORGANIZED HEALTH CARE EDUCATION/TRAINING PROGRAM

## 2022-10-18 PROCEDURE — 99396 PREV VISIT EST AGE 40-64: CPT | Performed by: STUDENT IN AN ORGANIZED HEALTH CARE EDUCATION/TRAINING PROGRAM

## 2022-10-18 PROCEDURE — G8417 CALC BMI ABV UP PARAM F/U: HCPCS | Performed by: STUDENT IN AN ORGANIZED HEALTH CARE EDUCATION/TRAINING PROGRAM

## 2022-10-18 RX ORDER — FLUTICASONE FUROATE, UMECLIDINIUM BROMIDE AND VILANTEROL TRIFENATATE 100; 62.5; 25 UG/1; UG/1; UG/1
POWDER RESPIRATORY (INHALATION)
COMMUNITY
Start: 2022-10-17

## 2022-10-18 SDOH — ECONOMIC STABILITY: FOOD INSECURITY: WITHIN THE PAST 12 MONTHS, THE FOOD YOU BOUGHT JUST DIDN'T LAST AND YOU DIDN'T HAVE MONEY TO GET MORE.: NEVER TRUE

## 2022-10-18 SDOH — ECONOMIC STABILITY: FOOD INSECURITY: WITHIN THE PAST 12 MONTHS, YOU WORRIED THAT YOUR FOOD WOULD RUN OUT BEFORE YOU GOT MONEY TO BUY MORE.: NEVER TRUE

## 2022-10-18 ASSESSMENT — ENCOUNTER SYMPTOMS
ABDOMINAL PAIN: 0
SORE THROAT: 0
SHORTNESS OF BREATH: 0
NAUSEA: 0
COUGH: 0

## 2022-10-18 ASSESSMENT — PATIENT HEALTH QUESTIONNAIRE - PHQ9
2. FEELING DOWN, DEPRESSED OR HOPELESS: 0
SUM OF ALL RESPONSES TO PHQ QUESTIONS 1-9: 0
SUM OF ALL RESPONSES TO PHQ9 QUESTIONS 1 & 2: 0
SUM OF ALL RESPONSES TO PHQ QUESTIONS 1-9: 0
1. LITTLE INTEREST OR PLEASURE IN DOING THINGS: 0
SUM OF ALL RESPONSES TO PHQ QUESTIONS 1-9: 0
SUM OF ALL RESPONSES TO PHQ QUESTIONS 1-9: 0

## 2022-10-18 ASSESSMENT — SOCIAL DETERMINANTS OF HEALTH (SDOH): HOW HARD IS IT FOR YOU TO PAY FOR THE VERY BASICS LIKE FOOD, HOUSING, MEDICAL CARE, AND HEATING?: NOT HARD AT ALL

## 2022-10-18 NOTE — PROGRESS NOTES
[address] [facility] [address] [facility] [address]  Dept: [phone]  Dept Fax: [phone]  Loc: [phone]  PROGRESS NOTE      Visit Date: 10/18/2022    Windy Baires is a 36 y.o. female who presents today for:  Chief Complaint   Patient presents with    Annual Exam       Impression/Plan:  1. Encounter for well adult exam without abnormal findings  Doing well  Monthly labs per rheumatology, she will have these results faxed to us  Lipid panel ordered today  - Lipid, Fasting; Future    2. Class 1 obesity without serious comorbidity with body mass index (BMI) of 32.0 to 32.9 in adult, unspecified obesity type  Lipid panel ordered today    3. Pulmonary nodules  Noted on CT chest, will repeat per radiology recommendations 2/2023  - CT CHEST WO CONTRAST; Future    Return in about 1 year (around 10/18/2023) for [facility]. Subjective:  42yoF with history of Churg-Connie Syndrome, Allergic Rhinitis, GERD, Asthma, and obesity here for annual physical.    Churg-Connie Syndrome  Controlled. Takes Prednisone 1mg daily and follows with Dr. Bernarda Box of [facility] (twice yearly) and Dr. Ean Griffin (once yearly). Getting labs monthly from Dr. Bertha Cervantes. Allergic Rhinitis  Controlled. Takes Fluticasone Proprionate daily. Follows with Dr. Zach Goode of [place] [place] [facility]. Has completed a lot of testing for recently flare-ups, which have stopped since stopping gummi vitamins. Had CT to investigate possible carcinoid syndrome, which showed pulmonary nodules. Repeat due in February 2023, will order today. GERD  Controlled. Takes Omeprazole 20mg daily. Has been taking since she was pregnant with her second daughter. Symptomatic any time she misses a pill. Asthma  Controlled. Gets monthly Mepolizumab as well as Trelegy (recently changed from symbicort).   No longer needing albuterol. COVID (2021)  Diagnosed on 2021 with symptoms of lung pain, nausea, cough, fatigue, and body aches. Received Regeneron [facility]. Was also taking Vit C, Vit D, and Zinc. CT Chest 2022 showed 4 unchanged pulmonary nodules of the right lower lobe; radiology recommended repeat in 1 year. Preventive  UTD with PNA, Tdap (2012), Pap (2022 - normal with -HPV, repeat in 5 years)  Need for Flu (will complete at work)  Mammogram in 2 weeks (last completed 2021)  DEXA scans (ordered by Dr. Sanchez Mckeon)    No further questions of complaints. Review of Systems   Constitutional:  Negative for chills and fever. HENT:  Negative for congestion and sore throat. Eyes:  Negative for visual disturbance. Respiratory:  Negative for cough and shortness of breath. Cardiovascular:  Negative for chest pain. Gastrointestinal:  Negative for abdominal pain and nausea. Genitourinary:  Negative for dysuria. Skin:  Negative for rash. Neurological:  Negative for dizziness, light-headedness and headaches. Psychiatric/Behavioral:  The patient is not nervous/anxious.       Patient Active Problem List   Diagnosis    Churg-Connie syndrome (HCC)    Allergic rhinitis    Chronic sinusitis    Gastroesophageal reflux disease    Mild intermittent asthma without complication    Nasal polyps    COVID-19    Class 1 obesity due to excess calories with body mass index (BMI) of 32.0 to 32.9 in adult     Past Medical History:   Diagnosis Date    Allergic rhinitis     Asthma     Churg-Connie syndrome with lung involvement ([address] 7068    Complication of anesthesia     Did not have a good spinal block    SVT (supraventricular tachycardia) ([address]       Past Surgical History:   Procedure Laterality Date     SECTION  2008    CHOLECYSTECTOMY, LAPAROSCOPIC N/A 3/22/2019    ROBOT LAP YENIFER performed by Gurdeep Oropeza MD at [facility]    ERCP N/A 3/20/2019    ERCP ENDOSCOPIC RETROGRADE CHOLANGIOPANCREATOGRAPHY performed by Madiha Miller MD at [address] [facility]    ERCP  3/20/2019    ERCP SPHINCTER/PAPILLOTOMY performed by Madiha Miller MD at [facility]  2007    SINUS SURGERY  6/2014    Per Dr. Yajaira Bhatia. SINUS SURGERY Bilateral 11/2019    Balloon Plasty with antibiotic stents- [place]    TONSILLECTOMY AND ADENOIDECTOMY      TYMPANOSTOMY TUBE PLACEMENT Left 3/15, 10/15    Dr. Ulysses Rave Left      Family History   Problem Relation Age of Onset    High Blood Pressure Mother     Arthritis Mother     Cancer Maternal Grandmother         Breast cancer    Arthritis Maternal Grandmother     Arthritis Maternal Grandfather     Alzheimer's Disease Maternal Grandfather     Alzheimer's Disease Paternal Grandmother      Social History     Tobacco Use    Smoking status: Never    Smokeless tobacco: Never   Substance Use Topics    Alcohol use: No      Current Outpatient Medications   Medication Sig Dispense Refill    TRELEGY ELLIPTA 100-62.5-25 MCG/INH AEPB       omeprazole (PRILOSEC) 20 MG delayed release capsule TAKE 1 CAPSULE BY MOUTH EVERY DAY 90 capsule 3    predniSONE (DELTASONE) 1 MG tablet Take 1 tablet by mouth daily 90 tablet 0    predniSONE (DELTASONE) 5 MG tablet Take 1 tablet by mouth daily 90 tablet 0    Fluticasone Propionate 93 MCG/ACT EXHU by Nasal route 2 times daily      Multiple Vitamin (MULTI-VITAMIN DAILY PO) Take by mouth      CALCIUM PO Take by mouth      mepolizumab (NUCALA) 100 MG SOLR injection Once monthly      SPRINTEC 28 0.25-35 MG-MCG per tablet Take by mouth daily      levocetirizine (XYZAL) 5 MG tablet Take 5 mg by mouth as needed      albuterol (PROVENTIL HFA;VENTOLIN HFA) 108 (90 BASE) MCG/ACT inhaler Inhale 2 puffs into the lungs every 6 hours as needed for Wheezing or Shortness of Breath       No current facility-administered medications for this visit.      Allergies   Allergen Reactions    Biaxin [Clarithromycin] Hives    Ceclor [Cefaclor] Hives       Immunization History   Administered Date(s) Administered    Influenza 10/01/2014, 10/01/2015    Pneumococcal Conjugate 13-valent ([id number]) 09/24/2019    Pneumococcal Polysaccharide (Aoslsvvii23) 01/01/2008, 11/26/2019    Tdap (Boostrix, Adacel) 12/17/2012     Health Maintenance   Topic Date Due    COVID-19 Vaccine (1) Never done    Depression Screen  Never done    Flu vaccine (1) 08/01/2022    DTaP/Tdap/Td vaccine (2 - Td or Tdap) 12/17/2022    Lipids  05/05/2025    Cervical cancer screen  06/28/2025    Pneumococcal 0-64 years Vaccine (3 - PPSV23 or PCV20) 09/04/2044    HIV screen  Completed    Hepatitis A vaccine  Aged Out    Hib vaccine  Aged Out    Meningococcal (ACWY) vaccine  Aged Out    Hepatitis C screen  Discontinued       LABS  Lab Results   Component Value Date    LABA1C 5.4 10/19/2018     No results found for: EAG  No components found for: CHLPL  Lab Results   Component Value Date    TRIG 69 05/05/2020    TRIG 52 05/10/2018    TRIG 62 04/26/2017     Lab Results   Component Value Date    HDL 69 05/05/2020    HDL 69 05/10/2018    HDL 68 04/26/2017     Lab Results   Component Value Date    LDLCALC 44 05/05/2020    LDLCALC 89 05/10/2018    LDLCALC 85 04/26/2017       Chemistry        Component Value Date/Time     03/22/2019 0629    K 5.0 03/22/2019 0629    K 4.1 03/20/2019 0616     03/22/2019 0629    CO2 29 03/22/2019 0629    BUN 6 (L) 03/22/2019 0629    CREATININE 0.5 03/22/2019 0629        Component Value Date/Time    CALCIUM 8.6 03/22/2019 0629    ALKPHOS 166 (H) 03/22/2019 0629    AST 80 (H) 03/22/2019 0629     (H) 03/22/2019 0629    BILITOT 1.1 03/22/2019 0629          No results found for: Garret Reveal  No results found for: TSH, P1YRTZW, C0AMHXT, THYROIDAB  No results found for: PSA  Lab Results   Component Value Date    WBC 7.6 03/22/2019    HGB 12.6 03/22/2019    HCT 39.1 03/22/2019    MCV 94.7 03/22/2019     03/22/2019       Objective:  Pulse 73   Temp 98.5 °F (36.9 °C) (Oral)   Resp 16   Ht 5' 8\" (1.727 m)   Wt 215 lb 9.6 oz (97.8 kg)   LMP 09/18/2022 (Approximate)   SpO2 99%   BMI 32.78 kg/m²     Physical Exam  Constitutional:       General: She is not in acute distress. Appearance: Normal appearance. HENT:      Head: Normocephalic. Right Ear: External ear normal.      Left Ear: External ear normal.      Nose: Nose normal.      Mouth/Throat:      Mouth: Mucous membranes are moist.   Eyes:      General: No scleral icterus. Extraocular Movements: Extraocular movements intact. Cardiovascular:      Rate and Rhythm: Normal rate and regular rhythm. Pulses: Normal pulses. Heart sounds: Normal heart sounds. Pulmonary:      Effort: Pulmonary effort is normal.      Breath sounds: Normal breath sounds. Abdominal:      General: Abdomen is flat. There is no distension. Palpations: Abdomen is soft. Musculoskeletal:         General: Normal range of motion. Cervical back: Normal range of motion. Skin:     General: Skin is warm and dry. Neurological:      Mental Status: She is alert. Motor: No weakness. Psychiatric:         Mood and Affect: Mood normal.       They voiced understanding. All questions answered. They agreed with treatment plan. See patient instructions for any educational materials that may have been given. Discussed use, benefit, and side effects of prescribed medications. Reviewed health maintenance.     (Please note that portions of this note may have been completed with a voice recognition program.  Efforts were made to edit the dictation but occasionally words are mis-transcribed.)      Electronically signed by Kristal Chavarria DO on 10/18/2022 at 1:53 PM

## 2022-10-18 NOTE — PROGRESS NOTES
Health Maintenance Due   Topic Date Due    COVID-19 Vaccine (1) Never done    Depression Screen  Never done    Flu vaccine (1) 08/01/2022

## 2022-11-22 ENCOUNTER — OFFICE VISIT (OUTPATIENT)
Dept: RHEUMATOLOGY | Age: 42
End: 2022-11-22
Payer: COMMERCIAL

## 2022-11-22 VITALS
OXYGEN SATURATION: 97 % | BODY MASS INDEX: 32.86 KG/M2 | HEIGHT: 68 IN | SYSTOLIC BLOOD PRESSURE: 130 MMHG | HEART RATE: 91 BPM | WEIGHT: 216.8 LBS | DIASTOLIC BLOOD PRESSURE: 82 MMHG

## 2022-11-22 DIAGNOSIS — Z51.81 MEDICATION MONITORING ENCOUNTER: ICD-10-CM

## 2022-11-22 DIAGNOSIS — J34.89 NASAL SORE: ICD-10-CM

## 2022-11-22 DIAGNOSIS — M30.1 CHURG-STRAUSS SYNDROME (HCC): ICD-10-CM

## 2022-11-22 DIAGNOSIS — Z79.52 CURRENT CHRONIC USE OF SYSTEMIC STEROIDS: Primary | ICD-10-CM

## 2022-11-22 DIAGNOSIS — D72.18 CHURG-STRAUSS SYNDROME (HCC): ICD-10-CM

## 2022-11-22 PROCEDURE — G8417 CALC BMI ABV UP PARAM F/U: HCPCS | Performed by: INTERNAL MEDICINE

## 2022-11-22 PROCEDURE — G8427 DOCREV CUR MEDS BY ELIG CLIN: HCPCS | Performed by: INTERNAL MEDICINE

## 2022-11-22 PROCEDURE — 1036F TOBACCO NON-USER: CPT | Performed by: INTERNAL MEDICINE

## 2022-11-22 PROCEDURE — 99214 OFFICE O/P EST MOD 30 MIN: CPT | Performed by: INTERNAL MEDICINE

## 2022-11-22 PROCEDURE — G8484 FLU IMMUNIZE NO ADMIN: HCPCS | Performed by: INTERNAL MEDICINE

## 2022-11-22 RX ORDER — PREDNISONE 1 MG/1
1 TABLET ORAL DAILY
Qty: 90 TABLET | Refills: 3 | Status: SHIPPED | OUTPATIENT
Start: 2022-11-22

## 2022-11-22 RX ORDER — PREDNISONE 1 MG/1
5 TABLET ORAL 3 TIMES DAILY
Qty: 90 TABLET | Refills: 0 | Status: SHIPPED | OUTPATIENT
Start: 2022-11-22

## 2022-11-22 ASSESSMENT — ENCOUNTER SYMPTOMS
EYE REDNESS: 0
CONSTIPATION: 0
EYES NEGATIVE: 1
SHORTNESS OF BREATH: 1
EYE PAIN: 0
COUGH: 0
DIARRHEA: 0
NAUSEA: 0
VOMITING: 0

## 2022-11-22 NOTE — PROGRESS NOTES
[facility] RHEUMATOLOGY FOLLOW UP NOTE       Date Of Service: 11/22/2022  Provider: Nura Arana DO, DO    Name: Tawana Sidhu   MRN: [id number]    Chief Complaint(s)     Chief Complaint   Patient presents with    Follow-up     1 year follow up        History of Present Illness (HPI)     Tawana Sidhu  is a(n)42 y.o. female with a hx ofchurg-Connie syndrome, Chronic sinusitis, GERD, Mild intermittent Asthma, allergic rhinitis here for the f/u  evaluation of her Churg-Connie, chronic prednisone use . Per pt and chart review she had a long standing h/o allergies and asthma, that worsened around 2002. In 2006 she began having progressive sxs including sinonasal symptoms,  Surgery in 10/2007 with septal deviation and polyp removal. Severe recurrent asthma, increasing eosinophilia up to 55%, severe arthralgias w/o swelling, fatigue, numbness in bilateral feet, and skin rash (legs and forearms) with bx revealing pervsacular dermatitis with prominent eosinophils. Stablized with prednisone during 2008 pregnancy. Started seeing Dr. Andrew Livingston in 2008 and diagnosed with Jayne Olszewski (EGPA). She had Recurrence of rash in 2009 on b/l lower extremities that resolved with prednisone but returned with tapering. Initialy evaluated at [facility] 8/18/09 at which time Azathioprine was discussed which she started on 9/09. Eliseo Patella Sxs improved and prednisone was taper w/o recurrence of rash. She wanted to pursue another child so the imuran was tapered and discontinued on 2/12. She conceived and her pregnancy went well and was far less complicated than her first pregnancy. Daughter deliver on 12/12. Increased sinonasal sxs and asthmatic features with peripheral einophila increased around her 7/13 evaluation. Imuran restarted. In 3/2016 Mepolizumab 100mg/victor hugo was started and her asthma significantly improved, w/ improvement of the PFT, Exertional SOB. Pt weaned off Imuran on 9/16.    Prednisone down to 7mg daily 1/17. continued recurrent sinusitis from nasal polyps but her asthma and vasculitis were controlled. SEEING Allergist and ENT currently. nov 2020 - w/ 5-7 episodes. Generalized tingling sensation - wheezing / sob , abd cramping, hot sensation light headedness, occasional hives. Normal pulse ox , previously with mildly low BP. - improvement w/ benadryl, inhaler within 30 min. Having some trembling sensation prior to resolution. -- allergic type reaction - neg evaluation for carcinoid tumor. Allergy testing (mild mild allergy) - currently evaluation for histamine specialist evaluation. ? Relation to the underlying churg flor. CT imaging with pulmonary nodules and repeat imaging ordered. -- Asthma improved since transition from Symbicort to Treligy but noted 10 lb weight gain  -- D/C vitamins- allergy testing revealing geletin sensitivity -- allergic reactions. Churg crystal: -  sinus congestion worse with season changed, but improved with xhance nasal Spary but she has to stop after a few months b/c of epistaxis. Nasal polyposis continue. Denies neuropathy, urinary changes, inflammatory arthritis/arthralgias,            Current therapy:   - Levocetirizine 5mg   - Mepolizumab 300mg (sept 2018 from 100mg qd since 3980-7769)  - Prednisone 6mg daily   - Bactrim DS M/W/F -- Dr. Tasha Bone: (ROS)    Review of Systems   Constitutional:  Negative for diaphoresis, fatigue and fever. HENT:  Positive for congestion. Negative for hearing loss and nosebleeds. Eyes: Negative. Negative for pain and redness. Respiratory:  Positive for shortness of breath. Negative for cough. Cardiovascular: Negative. Negative for chest pain. Gastrointestinal:  Negative for constipation, diarrhea, nausea and vomiting. Genitourinary:  Negative for difficulty urinating, frequency and hematuria. Musculoskeletal:  Negative for myalgias. Skin:  Negative for rash.    Neurological:  Negative for dizziness, weakness and headaches. Hematological:  Does not bruise/bleed easily. Psychiatric/Behavioral:  Negative for sleep disturbance. The patient is not nervous/anxious. PAST MEDICAL HISTORY     has a past medical history of Allergic rhinitis, Asthma, Churg-Connie syndrome with lung involvement ([address] Complication of anesthesia, and SVT (supraventricular tachycardia) ([address] PAST SURGICAL HISTORY     has a past surgical history that includes sinus surgery (); Tonsillectomy and adenoidectomy;  section (2008); sinus surgery (2014); Tympanostomy tube placement (Left, 3/15, 10/15); Tympanostomy tube placement (Left); ERCP (N/A, 3/20/2019); ERCP (3/20/2019); Cholecystectomy, laparoscopic (N/A, 3/22/2019); and sinus surgery (Bilateral, 2019). Yvonne Diana FAMILY HISTORY      Family History   Problem Relation Age of Onset    High Blood Pressure Mother     Arthritis Mother     Cancer Maternal Grandmother         Breast cancer    Arthritis Maternal Grandmother     Arthritis Maternal Grandfather     Alzheimer's Disease Maternal Grandfather     Alzheimer's Disease Paternal Grandmother        SOCIAL HISTORY     reports that she has never smoked. She has never used smokeless tobacco. She reports that she does not drink alcohol and does not use drugs.     ALLERGIES     Allergies   Allergen Reactions    Biaxin [Clarithromycin] Hives    Ceclor [Cefaclor] Hives       CURRENT MEDICATIONS      Current Outpatient Medications:     TRELEGY ELLIPTA 100-62.5-25 MCG/INH AEPB, , Disp: , Rfl:     omeprazole (PRILOSEC) 20 MG delayed release capsule, TAKE 1 CAPSULE BY MOUTH EVERY DAY, Disp: 90 capsule, Rfl: 3    predniSONE (DELTASONE) 1 MG tablet, Take 1 tablet by mouth daily, Disp: 90 tablet, Rfl: 0    predniSONE (DELTASONE) 5 MG tablet, Take 1 tablet by mouth daily, Disp: 90 tablet, Rfl: 0    Fluticasone Propionate 93 MCG/ACT EXHU, by Nasal route 2 times daily, Disp: , Rfl:     Multiple Vitamin (MULTI-VITAMIN DAILY PO), Take by mouth, Disp: , Rfl:     CALCIUM PO, Take by mouth, Disp: , Rfl:     mepolizumab (NUCALA) 100 MG SOLR injection, Once monthly, Disp: , Rfl:     SPRINTEC 28 0.25-35 MG-MCG per tablet, Take by mouth daily, Disp: , Rfl:     levocetirizine (XYZAL) 5 MG tablet, Take 5 mg by mouth as needed, Disp: , Rfl:     albuterol (PROVENTIL HFA;VENTOLIN HFA) 108 (90 BASE) MCG/ACT inhaler, Inhale 2 puffs into the lungs every 6 hours as needed for Wheezing or Shortness of Breath, Disp: , Rfl:     PHYSICAL EXAMINATION / OBJECTIVE     Objective:  /82 (Site: Left Upper Arm, Position: Sitting, Cuff Size: Large Adult)   Pulse 91   Ht 5' 7.99\" (1.727 m)   Wt 216 lb 12.8 oz (98.3 kg)   SpO2 97%   BMI 32.97 kg/m²     General Appearance: General appearance:  AAO x 3 ,  well-developed and well nourished  Head: normocephalic and atraumatic  Eyes: No gross abnormalities. ,  Sclera non-icteric  Ears / nose:  normal external appearance of left and right ear and nose. No active drainage from nose. mouth:  MMM, ears w/o deformities  Neck: No jugular venous distention, appears symmetric, good ROM  Lymph:  No adenopathy   Pulmonary/Chest: CTA bilateral ,  symmetric chest expansion. Cardiovascular: Normal S1 and S2, NO murmur, rub, gallop  : Deferred   Abd/GI: Deferred   Neurologic:  speech normal,   no facial drooping, no asterixis  Skin:  No rash on exposed extremities.    Extremities:   Musculoskeletal:   Non-tender, no swelling            LABS      CBC  Lab Results   Component Value Date/Time    WBC 7.6 03/22/2019 06:29 AM    WBC 10.4 03/21/2019 07:43 AM    WBC 6.3 03/20/2019 06:16 AM    RBC 4.13 03/22/2019 06:29 AM    HGB 12.6 03/22/2019 06:29 AM    HGB 12.3 03/21/2019 07:43 AM    HGB 13.2 03/20/2019 06:16 AM    HCT 39.1 03/22/2019 06:29 AM    MCV 94.7 03/22/2019 06:29 AM    RDW 13.1 12/16/2012 06:01 AM     03/22/2019 06:29 AM     03/21/2019 07:43 AM     03/20/2019 06:16 AM CMP  Lab Results   Component Value Date/Time    CALCIUM 8.6 03/22/2019 06:29 AM    LABALBU 3.1 03/22/2019 06:29 AM    PROT 6.1 03/22/2019 06:29 AM     03/22/2019 06:29 AM    K 5.0 03/22/2019 06:29 AM    K 4.1 03/20/2019 06:16 AM    CO2 29 03/22/2019 06:29 AM     03/22/2019 06:29 AM    BUN 6 03/22/2019 06:29 AM    CREATININE 0.5 03/22/2019 06:29 AM    CREATININE 0.4 03/21/2019 07:43 AM    CREATININE 0.3 03/20/2019 06:16 AM    ALKPHOS 166 03/22/2019 06:29 AM     03/22/2019 06:29 AM     03/21/2019 07:43 AM     03/20/2019 06:16 AM    AST 80 03/22/2019 06:29 AM     03/21/2019 07:43 AM     03/20/2019 06:16 AM     RADIOLOGY / PROCEDURES:         ASSESSMENT/PLAN    Assessment   Plan     1. Churg-Connie syndrome ([facility] [address] [address]        Churg-connie syndrome -- stable   - dx'ed 5/2009: prolonged h/o allergies, recurrent asthma. Increased sinonasal sx's aroudn 2006. 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 (+) skin rash on bilateral legs, and Rt arm w/ biopsy revealing perivascular dermatitis w/ prominent eosinophils. Paresthesias resoled after therapy started. Negative ANCA's in 2009.    - Prior tx AZA (9/09-2/12,7/13-9/16),Mepolizumab 100mg qd (3/16-Aug/2018)    - followed and Treated by Dr. Areli Rowe at [facility] since 2009. Current therapy                - Mepolizumab 300mg q month (sept 2018)                - Prednisone currently 6 mg daily (5/09-present) - worsened sx's around seaons changes @ 5mg qd               - Bactrim DS M/W/F   - holding on the pursuit of additional or alternative medication at this time      Hives/wheezing    - resolved after d/c of the MVI and ? Geletin sensitivity after allergy testing. Pulmonary nodules - re-evaluation ordered by PCP and stable. Chronic systemic steroids. - prednisone 6mg daily with intermittent burst depending upon sinus issues.     - calcium  < 1500mg day of supplementation, and vitamin D supplementation.    - DXA normal in 2016, sept. 2022.    - REQUEST DEXA FROM [facility] -- new order placed by Dr. Jacklyn Walsh     Medication monitoring: - CBC, CMP, ESR, CRP, U/A -- will need to request the labs from [facility]  - alt. Visits with [facility] rheumatologist.       Return in about 1 year (around 11/22/2023). Electronically signed by Isaiah Box,  on 11/22/2022 at 7:47 AM    New Prescriptions    No medications on file       Thank you for allowing me to participate in the care of this patient. Please call if there are any questions.

## 2023-01-10 ENCOUNTER — TELEPHONE (OUTPATIENT)
Dept: FAMILY MEDICINE CLINIC | Age: 43
End: 2023-01-10

## 2023-01-10 NOTE — TELEPHONE ENCOUNTER
----- Message from Stacey Oreillyhua sent at 1/10/2023 11:57 AM EST -----  Subject: Message to Provider    QUESTIONS  Information for Provider? PT is requesting a call back to discuss her   repeat CT-scan. She was told to call back once she has the info. Her   contact# is [phone]  ---------------------------------------------------------------------------  --------------  CALL BACK INFO  [phone]; OK to leave message on voicemail  ---------------------------------------------------------------------------  --------------  SCRIPT ANSWERS  Relationship to Patient? Self

## 2023-01-10 NOTE — TELEPHONE ENCOUNTER
Spoke with Patient, pt has to have a CT done every year. Pt works for [employer], pt is wanting testing to be done at [employer]. Last CT was 2/2/22    Dina Monterroso  [phone]    [facility] primary [facility]   [facility] secondary ins      Pt is wanting testing to be done the end to January beginning of February.

## 2023-01-17 NOTE — TELEPHONE ENCOUNTER
CT is approved through primary insurance but secondary [employer] ,needs peer to peer.  # [phone]

## 2023-01-17 NOTE — TELEPHONE ENCOUNTER
Peer to peer prior authorization completed. Approval #[id number]. Authorization is good for 45 days. Thanks!   ES

## 2023-02-22 ENCOUNTER — TELEPHONE (OUTPATIENT)
Dept: FAMILY MEDICINE CLINIC | Age: 43
End: 2023-02-22

## 2023-02-22 NOTE — TELEPHONE ENCOUNTER
Pt called requesting CT results that she had done at [facility] on 1/20/23. Please advise.

## 2023-06-22 ENCOUNTER — OFFICE VISIT (OUTPATIENT)
Dept: CARDIOLOGY CLINIC | Age: 43
End: 2023-06-22
Payer: COMMERCIAL

## 2023-06-22 VITALS
WEIGHT: 224.4 LBS | SYSTOLIC BLOOD PRESSURE: 132 MMHG | HEIGHT: 68 IN | BODY MASS INDEX: 34.01 KG/M2 | DIASTOLIC BLOOD PRESSURE: 84 MMHG | HEART RATE: 87 BPM

## 2023-06-22 DIAGNOSIS — R00.2 HEART PALPITATIONS: Primary | ICD-10-CM

## 2023-06-22 DIAGNOSIS — I47.1 SVT (SUPRAVENTRICULAR TACHYCARDIA) (HCC): ICD-10-CM

## 2023-06-22 PROCEDURE — 99204 OFFICE O/P NEW MOD 45 MIN: CPT | Performed by: NUCLEAR MEDICINE

## 2023-06-22 PROCEDURE — G8427 DOCREV CUR MEDS BY ELIG CLIN: HCPCS | Performed by: NUCLEAR MEDICINE

## 2023-06-22 PROCEDURE — 93000 ELECTROCARDIOGRAM COMPLETE: CPT | Performed by: NUCLEAR MEDICINE

## 2023-06-22 PROCEDURE — 1036F TOBACCO NON-USER: CPT | Performed by: NUCLEAR MEDICINE

## 2023-06-22 PROCEDURE — G8417 CALC BMI ABV UP PARAM F/U: HCPCS | Performed by: NUCLEAR MEDICINE

## 2023-06-22 ASSESSMENT — ENCOUNTER SYMPTOMS
ABDOMINAL DISTENTION: 0
BLOOD IN STOOL: 0
DIARRHEA: 0
VOMITING: 0
PHOTOPHOBIA: 0
CONSTIPATION: 0
COLOR CHANGE: 0
RECTAL PAIN: 0
ANAL BLEEDING: 0
SHORTNESS OF BREATH: 0
BACK PAIN: 0
CHEST TIGHTNESS: 0
ABDOMINAL PAIN: 0
NAUSEA: 0

## 2023-06-22 NOTE — PROGRESS NOTES
swelling, tenderness, deformity or signs of injury. Cervical back: Normal range of motion. Right lower leg: No edema. Left lower leg: No edema. Skin:     Coloration: Skin is not jaundiced or pale. Findings: No bruising, erythema, lesion or rash. Neurological:      Mental Status: She is alert and oriented to person, place, and time. Cranial Nerves: No cranial nerve deficit. Sensory: No sensory deficit. Motor: No weakness. Coordination: Coordination normal.      Gait: Gait normal.      Deep Tendon Reflexes: Reflexes normal.   Psychiatric:         Mood and Affect: Mood normal.     /84   Pulse 87   Ht 5' 7.5\" (1.715 m)   Wt 224 lb 6.4 oz (101.8 kg)   BMI 34.63 kg/m²     Assessment:      Diagnosis Orders   1. Heart palpitations  EKG 12 Lead      2. SVT (supraventricular tachycardia) (HCC)        Likely PACs and PVCs  Low risk for CAD  No angina  Previous SVT   Less likely SVT     Plan:  No follow-ups on file. Discussed  Patient re assurance  Consider more evaluation if clinically indicated  ? ? Event monitor   ?/ echo   Continue risk factor modification and medical management  Thank you for allowing me to participate in the care of your patient. Please don't hesitate to contact me regarding any further issues related to the patient care    Orders Placed:  Orders Placed This Encounter   Procedures    EKG 12 Lead     Order Specific Question:   Reason for Exam?     Answer: Other       Medications Prescribed:  No orders of the defined types were placed in this encounter. Discussed use, benefit, and side effects of prescribed medications. All patient questions answered. Pt voicedunderstanding. Instructed to continue current medications, diet and exercise. Continue risk factor modification and medical management. Patient agreed with treatment plan. Follow up as directed.     Electronically signedby Bijal Castro MD on 6/22/2023 at 7:46 AM

## 2023-07-11 ENCOUNTER — TELEPHONE (OUTPATIENT)
Dept: CARDIOLOGY CLINIC | Age: 43
End: 2023-07-11

## 2023-07-11 DIAGNOSIS — I47.1 SVT (SUPRAVENTRICULAR TACHYCARDIA) (HCC): ICD-10-CM

## 2023-07-11 DIAGNOSIS — R00.2 HEART PALPITATIONS: Primary | ICD-10-CM

## 2023-07-11 NOTE — TELEPHONE ENCOUNTER
Pt calling. She had f/u with her rheumatologist at [facility] who felt an echo may be a good idea for her with her autoimmune disorder. Pt states this was discussed at time of visit with Dr Frances Moore, but at the time was decided to hold off unless pt had further issues. Pt states she is doing fine, but just thought she should call and check on getting echo since it was recommended by rheumatologist. Please advise.

## 2023-07-12 NOTE — TELEPHONE ENCOUNTER
Attempted to reach patient to schedule echo - voicemail full, no message may be left. Will continue to attempt to reach patient - if patient calls, please transfer her to scheduling.

## 2023-07-18 ENCOUNTER — HOSPITAL ENCOUNTER (OUTPATIENT)
Dept: NON INVASIVE DIAGNOSTICS | Age: 43
Discharge: HOME | End: 2023-07-18
Attending: NUCLEAR MEDICINE
Payer: COMMERCIAL

## 2023-07-18 DIAGNOSIS — I47.1 SVT (SUPRAVENTRICULAR TACHYCARDIA) (HCC): ICD-10-CM

## 2023-07-18 DIAGNOSIS — R00.2 HEART PALPITATIONS: ICD-10-CM

## 2023-07-18 LAB
LV EF: 50 %
LVEF MODALITY: NORMAL

## 2023-07-18 PROCEDURE — 93306 TTE W/DOPPLER COMPLETE: CPT

## 2023-07-24 ENCOUNTER — TELEPHONE (OUTPATIENT)
Dept: CARDIOLOGY CLINIC | Age: 43
End: 2023-07-24

## 2023-07-25 ENCOUNTER — NURSE ONLY (OUTPATIENT)
Dept: LAB | Age: 43
End: 2023-07-25

## 2023-08-03 LAB — CYTOLOGY THIN PREP PAP: NORMAL

## 2023-08-18 NOTE — PROGRESS NOTES
[address] [address]  Dept: [phone]  Dept Fax: [phone]  YAZMIN Maldonado is a 36 y. o.female    Pt presents for annual wellness physical exam.      Patient also presents for follow-up of Churg-Connie syndrome, allergic rhinitis, GERD, and mild intermittent asthma    (updated 8/24/2023)  Pt continues seeing Dr. Bianka Martinez ([facility]) for Churg-Conine. Pt also seeing Dr. Ewelina Gutierrez locally for Churg-Connie- currently using Nucala and Prednisone  Pt also continues seeing Dr. Luis Gunderson [facility], [facility] and Allergy)   Pt saw Dr. 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 On Zyrtec BID and Omeprazole 20 mg QD. Doing much better at this time- no further reactions. Pt saw Dr. Frances Moore recently re \"flutters\" - EKG ok. Pt deferred further testing, except Echo (normal)     Glucometer readings at home are not needed. The home BP readings have not been checked recently. Wt Readings from Last 3 Encounters:   08/24/23 221 lb (100.2 kg)   06/22/23 224 lb 6.4 oz (101.8 kg)   11/22/22 216 lb 12.8 oz (98.3 kg)   Weight increased 6# since last visit 10 months ago. \"I'm working on it\"     Pt stable since last visit- no new problems for diagnoses listed below:  Patient Active Problem List   Diagnosis    Churg-Connie syndrome (HCC)    Allergic rhinitis    Chronic sinusitis    Gastroesophageal reflux disease    Mild intermittent asthma without complication    Nasal polyps    COVID-19    Class 1 obesity due to excess calories with body mass index (BMI) of 32.0 to 32.9 in adult     Review of Systems   Constitutional:  Negative for chills, diaphoresis, fatigue, fever and unexpected weight change. Eyes:  Negative for visual disturbance.    Respiratory:  Negative for chest

## 2023-08-24 ENCOUNTER — OFFICE VISIT (OUTPATIENT)
Dept: FAMILY MEDICINE CLINIC | Age: 43
End: 2023-08-24
Payer: COMMERCIAL

## 2023-08-24 VITALS
HEART RATE: 77 BPM | BODY MASS INDEX: 33.49 KG/M2 | OXYGEN SATURATION: 99 % | TEMPERATURE: 97 F | DIASTOLIC BLOOD PRESSURE: 60 MMHG | RESPIRATION RATE: 16 BRPM | HEIGHT: 68 IN | SYSTOLIC BLOOD PRESSURE: 102 MMHG | WEIGHT: 221 LBS

## 2023-08-24 DIAGNOSIS — Z13.1 SCREENING FOR DIABETES MELLITUS: ICD-10-CM

## 2023-08-24 DIAGNOSIS — J30.9 ALLERGIC RHINITIS, UNSPECIFIED SEASONALITY, UNSPECIFIED TRIGGER: ICD-10-CM

## 2023-08-24 DIAGNOSIS — Z00.00 LABORATORY EXAM ORDERED AS PART OF ROUTINE GENERAL MEDICAL EXAMINATION: ICD-10-CM

## 2023-08-24 DIAGNOSIS — Z00.00 WELL ADULT EXAM: Primary | ICD-10-CM

## 2023-08-24 DIAGNOSIS — D72.18 CHURG-STRAUSS SYNDROME (HCC): ICD-10-CM

## 2023-08-24 DIAGNOSIS — J45.20 MILD INTERMITTENT ASTHMA WITHOUT COMPLICATION: ICD-10-CM

## 2023-08-24 DIAGNOSIS — M30.1 CHURG-STRAUSS SYNDROME (HCC): ICD-10-CM

## 2023-08-24 DIAGNOSIS — K21.9 GASTROESOPHAGEAL REFLUX DISEASE, UNSPECIFIED WHETHER ESOPHAGITIS PRESENT: ICD-10-CM

## 2023-08-24 DIAGNOSIS — E66.9 OBESITY WITH SERIOUS COMORBIDITY, UNSPECIFIED CLASSIFICATION, UNSPECIFIED OBESITY TYPE: ICD-10-CM

## 2023-08-24 DIAGNOSIS — Z13.220 LIPID SCREENING: ICD-10-CM

## 2023-08-24 PROCEDURE — 99396 PREV VISIT EST AGE 40-64: CPT | Performed by: FAMILY MEDICINE

## 2023-08-24 RX ORDER — OMEPRAZOLE 20 MG/1
20 CAPSULE, DELAYED RELEASE ORAL DAILY
Qty: 90 CAPSULE | Refills: 3 | Status: SHIPPED | OUTPATIENT
Start: 2023-08-24

## 2023-08-24 RX ORDER — FLUTICASONE PROPIONATE 93 UG/1
1 SPRAY, METERED NASAL 2 TIMES DAILY PRN
COMMUNITY

## 2023-08-24 SDOH — ECONOMIC STABILITY: FOOD INSECURITY: WITHIN THE PAST 12 MONTHS, YOU WORRIED THAT YOUR FOOD WOULD RUN OUT BEFORE YOU GOT MONEY TO BUY MORE.: NEVER TRUE

## 2023-08-24 SDOH — ECONOMIC STABILITY: HOUSING INSECURITY
IN THE LAST 12 MONTHS, WAS THERE A TIME WHEN YOU DID NOT HAVE A STEADY PLACE TO SLEEP OR SLEPT IN A SHELTER (INCLUDING NOW)?: NO

## 2023-08-24 SDOH — ECONOMIC STABILITY: FOOD INSECURITY: WITHIN THE PAST 12 MONTHS, THE FOOD YOU BOUGHT JUST DIDN'T LAST AND YOU DIDN'T HAVE MONEY TO GET MORE.: NEVER TRUE

## 2023-08-24 SDOH — ECONOMIC STABILITY: INCOME INSECURITY: HOW HARD IS IT FOR YOU TO PAY FOR THE VERY BASICS LIKE FOOD, HOUSING, MEDICAL CARE, AND HEATING?: NOT HARD AT ALL

## 2023-08-24 ASSESSMENT — PATIENT HEALTH QUESTIONNAIRE - PHQ9
2. FEELING DOWN, DEPRESSED OR HOPELESS: 0
SUM OF ALL RESPONSES TO PHQ QUESTIONS 1-9: 0
1. LITTLE INTEREST OR PLEASURE IN DOING THINGS: 0
SUM OF ALL RESPONSES TO PHQ QUESTIONS 1-9: 0
SUM OF ALL RESPONSES TO PHQ9 QUESTIONS 1 & 2: 0
SUM OF ALL RESPONSES TO PHQ QUESTIONS 1-9: 0
SUM OF ALL RESPONSES TO PHQ QUESTIONS 1-9: 0

## 2023-08-24 ASSESSMENT — ENCOUNTER SYMPTOMS
CONSTIPATION: 0
VOMITING: 0
SHORTNESS OF BREATH: 0
CHEST TIGHTNESS: 0
DIARRHEA: 0
ABDOMINAL PAIN: 0
NAUSEA: 0
ANAL BLEEDING: 0
BLOOD IN STOOL: 0

## 2023-08-24 NOTE — PATIENT INSTRUCTIONS
Follow up with Dr. Mir Solis, Dr. Nicolas Grimaldo [facility]- Rheumatology), Dr. Leroy Ray (Allergy/ Asthma), and Dr. Russell Ashton (Sinus) as scheduled/ as needed. Continue to work on diet, exercise, and weight loss for optimal cardiovascular health. Continue current medicines. Refills   Pt to get copies of previous thyroid testing. Check FLP  Follow up in 12 months with me. Preventive Health Topics (updated 8/24/2023):  Encouraged Bivalent COVID VACCINE booster- pt declines. Encouraged FLU VACCINE after October 1st annually- pt declines. Encouraged TETANUS SHOT (TdaP/ ADACEL/ BOOSTRIX)- check with insurance re coverage and location of administration. ([facility] office vs local pharmacy)  Encouraged PREVNAR 20 after 11/26/2024  PAP/PELVIC management per Dr. Gene Mcdonald- last appt 7/27/2023- to follow up annually  MAMMO to be done after 11/1/2023  DEXA management per Dr. Nicolas Grimaldo at [facility] (done at [facility]) due to persistent steroid use- done most recently ? 9/2022? Pt to get copy and fax to office.

## 2023-09-05 DIAGNOSIS — D72.18 CHURG-STRAUSS SYNDROME (HCC): ICD-10-CM

## 2023-09-05 DIAGNOSIS — M30.1 CHURG-STRAUSS SYNDROME (HCC): ICD-10-CM

## 2023-09-05 RX ORDER — PREDNISONE 5 MG/1
5 TABLET ORAL 3 TIMES DAILY
Qty: 90 TABLET | Refills: 0 | Status: SHIPPED | OUTPATIENT
Start: 2023-09-05

## 2023-09-05 NOTE — TELEPHONE ENCOUNTER
Carin Wray called requesting a refill on the following medications:  Requested Prescriptions     Pending Prescriptions Disp Refills    predniSONE (DELTASONE) 5 MG tablet 90 tablet 0     Sig: Take 1 tablet by mouth in the morning, at noon, and at bedtime     Pharmacy verified: [address] [place] [address]  . pv    CAN PT HAVE REFILLS UNTIL HER NEXT APPT?     Date of last visit: 11/22/2022  Date of next visit (if applicable): 66/2/3602

## 2023-11-07 ENCOUNTER — OFFICE VISIT (OUTPATIENT)
Dept: RHEUMATOLOGY | Age: 43
End: 2023-11-07
Payer: COMMERCIAL

## 2023-11-07 VITALS
DIASTOLIC BLOOD PRESSURE: 70 MMHG | BODY MASS INDEX: 34.46 KG/M2 | WEIGHT: 227.4 LBS | HEART RATE: 89 BPM | OXYGEN SATURATION: 99 % | HEIGHT: 68 IN | SYSTOLIC BLOOD PRESSURE: 120 MMHG

## 2023-11-07 DIAGNOSIS — Z79.52 CURRENT CHRONIC USE OF SYSTEMIC STEROIDS: ICD-10-CM

## 2023-11-07 DIAGNOSIS — D72.18 CHURG-STRAUSS SYNDROME (HCC): Primary | ICD-10-CM

## 2023-11-07 DIAGNOSIS — M30.1 CHURG-STRAUSS SYNDROME (HCC): Primary | ICD-10-CM

## 2023-11-07 DIAGNOSIS — Z51.81 MEDICATION MONITORING ENCOUNTER: ICD-10-CM

## 2023-11-07 DIAGNOSIS — J33.9 NASAL POLYPS: ICD-10-CM

## 2023-11-07 PROCEDURE — G8427 DOCREV CUR MEDS BY ELIG CLIN: HCPCS | Performed by: INTERNAL MEDICINE

## 2023-11-07 PROCEDURE — 1036F TOBACCO NON-USER: CPT | Performed by: INTERNAL MEDICINE

## 2023-11-07 PROCEDURE — G8417 CALC BMI ABV UP PARAM F/U: HCPCS | Performed by: INTERNAL MEDICINE

## 2023-11-07 PROCEDURE — 99214 OFFICE O/P EST MOD 30 MIN: CPT | Performed by: INTERNAL MEDICINE

## 2023-11-07 PROCEDURE — G8484 FLU IMMUNIZE NO ADMIN: HCPCS | Performed by: INTERNAL MEDICINE

## 2023-11-07 RX ORDER — PREDNISONE 5 MG/1
5 TABLET ORAL 3 TIMES DAILY
Qty: 90 TABLET | Refills: 0 | Status: SHIPPED | OUTPATIENT
Start: 2023-11-07

## 2023-11-07 ASSESSMENT — ENCOUNTER SYMPTOMS
GASTROINTESTINAL NEGATIVE: 1
RESPIRATORY NEGATIVE: 1
EYES NEGATIVE: 1

## 2023-11-07 NOTE — PROGRESS NOTES
Increased sinonasal sx's aroudn 2006. 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 (+) skin rash on bilateral legs, and Rt arm w/ biopsy revealing perivascular dermatitis w/ prominent eosinophils. Paresthesias resoled after therapy started. Negative ANCA's in 2009.                - Prior tx AZA (9/09-2/12,7/13-9/16),Mepolizumab 100mg qd (3/16-Aug/2018)                - followed and Treated by Dr. Jonathon Bueno at [facility] since 2009. Current therapy                - Mepolizumab 300mg q month (sept 2018)                - Prednisone currently 6 mg daily (5/09-present) - worsened sx's around seaons changes @ 5mg qd               - Bactrim DS M/W/F                    Asthma- stable w/o significnat flares. ? Mastocytosis vs MAS- on anti-histamine - followed by local allergist     - prior evaluation by Lulu Wolff of [facility]. 2023.    - holding on bone biopsy per outside records. - currently on prilosec 20mg daily and zyrtec 10mg twice daily. Chronic systemic steroids. - prednisone 6mg daily with intermittent burst depending upon sinus issues. - calcium  < 1500mg day of supplementation, and vitamin D supplementation.                - DXA normal in 2016, sept. 2022.                - REQUEST DEXA FROM [facility] -- new order placed by Dr. Jonathon Bueno     Medication monitoring: - CBC, CMP, ESR, CRP, U/A -- will need to request the labs from [facility]  - alt. Visits with [facility] rheumatologist.     No diagnosis found. No follow-ups on file.     11/7/2023

## 2023-11-13 DIAGNOSIS — M30.1 CHURG-STRAUSS SYNDROME (HCC): ICD-10-CM

## 2023-11-13 DIAGNOSIS — D72.18 CHURG-STRAUSS SYNDROME (HCC): ICD-10-CM

## 2023-11-13 RX ORDER — PREDNISONE 5 MG/1
5 TABLET ORAL 3 TIMES DAILY
Qty: 90 TABLET | Refills: 12 | Status: SHIPPED | OUTPATIENT
Start: 2023-11-13

## 2023-11-13 RX ORDER — PREDNISONE 1 MG/1
1 TABLET ORAL DAILY
Qty: 30 TABLET | Refills: 12 | Status: SHIPPED | OUTPATIENT
Start: 2023-11-13

## 2023-11-13 NOTE — TELEPHONE ENCOUNTER
Pt called and stated that only the 5mg tab was sent to pharmacy and needs 1mg as well to make 6mg and she also stated that she needs both sent in for a year since she is only seen once a year. Please advise.     DOLV 11/7/23  DONV 11/5/24

## 2023-12-07 LAB
ALBUMIN SERPL-MCNC: 3.9 G/DL
ALP BLD-CCNC: 95 U/L
ALT SERPL-CCNC: 14 U/L
ANION GAP SERPL CALCULATED.3IONS-SCNC: 12 MMOL/L
AST SERPL-CCNC: 14 U/L
BASOPHILS ABSOLUTE: 0.1 /ΜL
BASOPHILS RELATIVE PERCENT: 0.6 %
BILIRUB SERPL-MCNC: 0.3 MG/DL (ref 0.1–1.4)
BUN BLDV-MCNC: 12 MG/DL
CALCIUM SERPL-MCNC: 8.9 MG/DL
CHLORIDE BLD-SCNC: 101 MMOL/L
CO2: 26 MMOL/L
CREAT SERPL-MCNC: 0.6 MG/DL
EGFR: NORMAL
EOSINOPHILS ABSOLUTE: 0.1 /ΜL
EOSINOPHILS RELATIVE PERCENT: 0.9 %
GLUCOSE BLD-MCNC: 68 MG/DL
HCT VFR BLD CALC: 42.5 % (ref 36–46)
HEMOGLOBIN: 13.3 G/DL (ref 12–16)
LYMPHOCYTES ABSOLUTE: 3.4 /ΜL
LYMPHOCYTES RELATIVE PERCENT: 34.1 %
MCH RBC QN AUTO: 28.7 PG
MCHC RBC AUTO-ENTMCNC: 31.3 G/DL
MCV RBC AUTO: 91.8 FL
MONOCYTES ABSOLUTE: 0.8 /ΜL
MONOCYTES RELATIVE PERCENT: 8 %
NEUTROPHILS ABSOLUTE: 5.6 /ΜL
NEUTROPHILS RELATIVE PERCENT: 56.2 %
PLATELET # BLD: 255 K/ΜL
PMV BLD AUTO: 11.7 FL
POTASSIUM SERPL-SCNC: 4.2 MMOL/L
RBC # BLD: 4.63 10^6/ΜL
SODIUM BLD-SCNC: 139 MMOL/L
TOTAL PROTEIN: 7.4
WBC # BLD: 10 10^3/ML

## 2023-12-15 LAB — LIPASE: 29 UNITS/L

## 2023-12-27 ENCOUNTER — TELEPHONE (OUTPATIENT)
Dept: FAMILY MEDICINE CLINIC | Age: 43
End: 2023-12-27

## 2023-12-27 DIAGNOSIS — R07.81 PLEURODYNIA: Primary | ICD-10-CM

## 2023-12-27 NOTE — TELEPHONE ENCOUNTER
Patient called in regarding an update on the prior auth that we started for her CT that was ordered. Patient stated that she and her insurance company had tried to touch base with us and were unsucessful,and that a fax was sent over from [employer] regarding the CT. Please review the fax sent over and update the order the the correct order per the insurance company. Patient's CT is scheduled tomorrow at 930. Thank you.

## 2024-01-04 ENCOUNTER — TELEPHONE (OUTPATIENT)
Dept: FAMILY MEDICINE CLINIC | Age: 44
End: 2024-01-04

## 2024-01-04 DIAGNOSIS — D18.03 LIVER HEMANGIOMA: Primary | ICD-10-CM

## 2024-01-04 NOTE — TELEPHONE ENCOUNTER
Notify patient-  Results reviewed.  CT abdomen shows probable small hemangioma in right lobe of liver-nothing further needed at this time.  CT otherwise unremarkable.  How is patient feeling?  ES

## 2024-01-04 NOTE — TELEPHONE ENCOUNTER
I would recommend referring to GI. Does patient have preference on who she sees? If not, I recommend Dr. Stevenson.

## 2024-01-04 NOTE — TELEPHONE ENCOUNTER
Dr. Koroma, as you saw patient, thoughts on this?  Did you discuss any other further testing with preceptor at time of visit?  ES

## 2024-01-04 NOTE — TELEPHONE ENCOUNTER
Patient States she is not in as much pain as she was prior but still uncomfortable and still feels like there is something wrong. Wants to know if there is something else that can be done.

## 2024-01-08 NOTE — TELEPHONE ENCOUNTER
This nurse left VM advising patient of GI consult and requested a call back to let us know who she would like to be seen by,

## 2024-01-12 NOTE — TELEPHONE ENCOUNTER
Alejandra Calderon called and states that for the GI referral she would like to be seen with Dr. Stevenson. Alejandra Calderon states that she does already have an appointment scheduled with the office is just awaiting the referral to be faxed. Please Advise.

## 2024-01-22 ENCOUNTER — TELEPHONE (OUTPATIENT)
Dept: FAMILY MEDICINE CLINIC | Age: 44
End: 2024-01-22

## 2024-01-22 NOTE — TELEPHONE ENCOUNTER
----- Message from Han Koroma DO sent at 1/22/2024  1:39 PM EST -----  CMP and CBC within normal limits

## 2024-03-07 ENCOUNTER — TELEPHONE (OUTPATIENT)
Dept: FAMILY MEDICINE CLINIC | Age: 44
End: 2024-03-07

## 2024-03-07 NOTE — TELEPHONE ENCOUNTER
LM  asking if April 18 will work with her at noon.  Know she needed 3 weeks notice if any cancellations other than a Tuesday.

## 2024-03-19 ENCOUNTER — HOSPITAL ENCOUNTER (OUTPATIENT)
Dept: MRI IMAGING | Age: 44
Discharge: HOME | End: 2024-03-19
Attending: INTERNAL MEDICINE
Payer: COMMERCIAL

## 2024-03-19 DIAGNOSIS — R10.11 RUQ PAIN: ICD-10-CM

## 2024-03-19 PROCEDURE — 74183 MRI ABD W/O CNTR FLWD CNTR: CPT

## 2024-03-19 PROCEDURE — 6360000004 HC RX CONTRAST MEDICATION: Performed by: INTERNAL MEDICINE

## 2024-03-19 PROCEDURE — A9579 GAD-BASE MR CONTRAST NOS,1ML: HCPCS | Performed by: INTERNAL MEDICINE

## 2024-03-19 RX ADMIN — GADOTERIDOL 20 ML: 279.3 INJECTION, SOLUTION INTRAVENOUS at 06:44

## 2024-04-02 DIAGNOSIS — K21.9 GASTROESOPHAGEAL REFLUX DISEASE, UNSPECIFIED WHETHER ESOPHAGITIS PRESENT: ICD-10-CM

## 2024-04-02 RX ORDER — OMEPRAZOLE 20 MG/1
20 CAPSULE, DELAYED RELEASE ORAL DAILY
Qty: 90 CAPSULE | Refills: 0 | Status: SHIPPED | OUTPATIENT
Start: 2024-04-02

## 2024-04-02 NOTE — TELEPHONE ENCOUNTER
Patient's last appointment was : 12/14/2023 with our   Patient's next appointment is : 4/18/2024 with our Dr. Noriega  Last refilled on: 08/24/2023  Which pharmacy does the script need sent to: [facility] [address]      Lab Results   Component Value Date    LABA1C 5.4 10/19/2018     Lab Results   Component Value Date    CHOL 154 05/05/2020    TRIG 69 05/05/2020    HDL 69 05/05/2020    LDLCALC 44 05/05/2020     Lab Results   Component Value Date     12/07/2023    K 4.2 12/07/2023     12/07/2023    CO2 26 12/07/2023    BUN 12 12/07/2023    CREATININE 0.6 12/07/2023    GLUCOSE 68 12/07/2023    CALCIUM 8.9 12/07/2023    PROT 6.1 03/22/2019    LABALBU 3.9 12/07/2023    BILITOT 0.3 12/07/2023    ALKPHOS 95 12/07/2023    AST 14 12/07/2023    ALT 14 12/07/2023    LABGLOM >90 03/22/2019     No results found for: \"TSH\", \"W1GWQID\", \"I2OHORQ\", \"THYROIDAB\", \"FT3\", \"T4FREE\"  Lab Results   Component Value Date    WBC 10.0 12/07/2023    HGB 13.3 12/07/2023    HCT 42.5 12/07/2023    MCV 91.8 12/07/2023     12/07/2023

## 2024-04-17 NOTE — PROGRESS NOTES
patient to track these items and any other symptoms on their list on a weekly basis to documenttheir progress or lack of same. This can be done on the symptom tracking sheet I gave them at today's visit but looks like this:                                                      Rate on scale of 0-10 with zero = not noticeable  Symptom:                            Week 1               2                 3                 4               Etc            Hair loss    Foot cramps    Paresthesia    Aches    IBS (irritable bowel)    Constipation    Diarrhea  Nocturia (up to bathroom at night)    Fatigue/Energy level  Stress      On the other side of the sheet they can track their food, drink, environment, activity, symptoms etc      Avoiding Latex-like proteins in my foods;    Avocados, Bananas, Celery, Figs & Kiwi proteins have latex-like proteins to inflame our immune systems, plus 47 more foods  How Can I Have A Latex Allergy?  Eating foods with latex-like protein exposes us to latex allergies.  Our body cannot tell the differencebetween these latex-like proteins and latex from rubber products since many people are allergic to fruit, vegetables and latex. Read labels on pre-packaged foods. This list to avoid is only a guide if you are known allergicto latex or have a latex rash on your chin, cheeks and lines on your neck and chest. The amount of latex is different in each food product or fruit variety.  Avoid out of Season if not grown locally:   Melon, Nectarine, Papaya, Cherry, Passion fruit, Plum, Chestnuts, and Tomato. Avocado, Banana, Celery, Figs, and Kiwi always contain Latex-like protein.    What’s in Season?  Strawberries taste better in June than December because June is strawberry season so buy locally grown produce \"in season\" for the best flavor, cost, and less Latex. Locally grown produce not only tastes great but also requires little or no ethylene exposure in food distribution so has less latex content.  Out

## 2024-04-18 ENCOUNTER — OFFICE VISIT (OUTPATIENT)
Dept: FAMILY MEDICINE CLINIC | Age: 44
End: 2024-04-18
Payer: COMMERCIAL

## 2024-04-18 VITALS
TEMPERATURE: 98.4 F | HEART RATE: 84 BPM | HEIGHT: 67 IN | RESPIRATION RATE: 12 BRPM | SYSTOLIC BLOOD PRESSURE: 136 MMHG | BODY MASS INDEX: 36.26 KG/M2 | WEIGHT: 231 LBS | DIASTOLIC BLOOD PRESSURE: 88 MMHG | OXYGEN SATURATION: 97 %

## 2024-04-18 DIAGNOSIS — E66.9 OBESITY WITH SERIOUS COMORBIDITY, UNSPECIFIED CLASSIFICATION, UNSPECIFIED OBESITY TYPE: ICD-10-CM

## 2024-04-18 DIAGNOSIS — E66.9 CLASS 1 OBESITY WITHOUT SERIOUS COMORBIDITY WITH BODY MASS INDEX (BMI) OF 32.0 TO 32.9 IN ADULT, UNSPECIFIED OBESITY TYPE: ICD-10-CM

## 2024-04-18 DIAGNOSIS — Z13.220 LIPID SCREENING: ICD-10-CM

## 2024-04-18 DIAGNOSIS — D18.03 LIVER HEMANGIOMA: ICD-10-CM

## 2024-04-18 DIAGNOSIS — Z71.89 ENCOUNTER FOR HERB AND VITAMIN SUPPLEMENT MANAGEMENT: ICD-10-CM

## 2024-04-18 DIAGNOSIS — R07.81 PLEURODYNIA: ICD-10-CM

## 2024-04-18 DIAGNOSIS — K21.9 GASTROESOPHAGEAL REFLUX DISEASE, UNSPECIFIED WHETHER ESOPHAGITIS PRESENT: ICD-10-CM

## 2024-04-18 DIAGNOSIS — M30.1 CHURG-STRAUSS SYNDROME (HCC): ICD-10-CM

## 2024-04-18 DIAGNOSIS — R07.81 RIB PAIN ON RIGHT SIDE: ICD-10-CM

## 2024-04-18 DIAGNOSIS — R91.8 PULMONARY NODULES: ICD-10-CM

## 2024-04-18 DIAGNOSIS — J45.20 MILD INTERMITTENT ASTHMA WITHOUT COMPLICATION: ICD-10-CM

## 2024-04-18 DIAGNOSIS — D72.18 CHURG-STRAUSS SYNDROME (HCC): ICD-10-CM

## 2024-04-18 DIAGNOSIS — J30.9 ALLERGIC RHINITIS, UNSPECIFIED SEASONALITY, UNSPECIFIED TRIGGER: ICD-10-CM

## 2024-04-18 PROCEDURE — 99215 OFFICE O/P EST HI 40 MIN: CPT | Performed by: FAMILY MEDICINE

## 2024-04-18 PROCEDURE — 1036F TOBACCO NON-USER: CPT | Performed by: FAMILY MEDICINE

## 2024-04-18 PROCEDURE — G8417 CALC BMI ABV UP PARAM F/U: HCPCS | Performed by: FAMILY MEDICINE

## 2024-04-18 PROCEDURE — G8427 DOCREV CUR MEDS BY ELIG CLIN: HCPCS | Performed by: FAMILY MEDICINE

## 2024-04-18 RX ORDER — MEPOLIZUMAB 100 MG/ML
INJECTION, SOLUTION SUBCUTANEOUS
COMMUNITY
Start: 2024-03-20 | End: 2024-04-18

## 2024-04-18 RX ORDER — EPINEPHRINE 0.3 MG/.3ML
INJECTION SUBCUTANEOUS
COMMUNITY
Start: 2024-02-11

## 2024-04-18 RX ORDER — OMEPRAZOLE 20 MG/1
20 CAPSULE, DELAYED RELEASE ORAL DAILY
Qty: 30 CAPSULE | Refills: 11 | Status: SHIPPED | OUTPATIENT
Start: 2024-04-18

## 2024-04-18 RX ORDER — SULFAMETHOXAZOLE AND TRIMETHOPRIM 800; 160 MG/1; MG/1
TABLET ORAL
COMMUNITY
Start: 2024-03-30

## 2024-04-18 ASSESSMENT — ENCOUNTER SYMPTOMS
SINUS PAIN: 1
APNEA: 1
GASTROINTESTINAL NEGATIVE: 1

## 2024-04-18 ASSESSMENT — PATIENT HEALTH QUESTIONNAIRE - PHQ9
SUM OF ALL RESPONSES TO PHQ QUESTIONS 1-9: 0
2. FEELING DOWN, DEPRESSED OR HOPELESS: NOT AT ALL
SUM OF ALL RESPONSES TO PHQ QUESTIONS 1-9: 0
SUM OF ALL RESPONSES TO PHQ9 QUESTIONS 1 & 2: 0
1. LITTLE INTEREST OR PLEASURE IN DOING THINGS: NOT AT ALL

## 2024-04-18 NOTE — TELEPHONE ENCOUNTER
Pt was here for a visit with Dr. Noriega.  While here, she is asking for a refill of the Omeprazole 20 mg to be filled until her wellness exam in March with you. (This was rescheduled per the office)    Dr. DU Pena refilled it for 90 days but patience insurance won't cover 90 days.  Only gave her a 30 day supply without refills.    Can she get a refill for 11 months?

## 2024-04-26 LAB
ALBUMIN SERPL-MCNC: 3.7 G/DL
ALP BLD-CCNC: 105 U/L
ALT SERPL-CCNC: 13 U/L
ANION GAP SERPL CALCULATED.3IONS-SCNC: 12 MMOL/L
AST SERPL-CCNC: 14 U/L
BILIRUB SERPL-MCNC: 0.3 MG/DL (ref 0.1–1.4)
BUN BLDV-MCNC: 10 MG/DL
CALCIUM SERPL-MCNC: 8.7 MG/DL
CHLORIDE BLD-SCNC: 101 MMOL/L
CO2: 25 MMOL/L
CREAT SERPL-MCNC: 0.6 MG/DL
EGFR: >90
GLUCOSE BLD-MCNC: 109 MG/DL
POTASSIUM SERPL-SCNC: 4.4 MMOL/L
SODIUM BLD-SCNC: 138 MMOL/L
TOTAL PROTEIN: 7.4

## 2024-05-10 ENCOUNTER — TELEMEDICINE (OUTPATIENT)
Dept: FAMILY MEDICINE CLINIC | Age: 44
End: 2024-05-10

## 2024-05-10 DIAGNOSIS — Z00.00 ENCOUNTER FOR WELL ADULT EXAM WITHOUT ABNORMAL FINDINGS: ICD-10-CM

## 2024-05-10 DIAGNOSIS — E66.9 CLASS 1 OBESITY WITHOUT SERIOUS COMORBIDITY WITH BODY MASS INDEX (BMI) OF 32.0 TO 32.9 IN ADULT, UNSPECIFIED OBESITY TYPE: ICD-10-CM

## 2024-05-10 DIAGNOSIS — K21.9 GASTROESOPHAGEAL REFLUX DISEASE, UNSPECIFIED WHETHER ESOPHAGITIS PRESENT: Primary | ICD-10-CM

## 2024-05-10 DIAGNOSIS — Z13.220 LIPID SCREENING: ICD-10-CM

## 2024-05-10 DIAGNOSIS — M30.1 CHURG-STRAUSS SYNDROME (HCC): ICD-10-CM

## 2024-05-10 DIAGNOSIS — D72.18 CHURG-STRAUSS SYNDROME (HCC): ICD-10-CM

## 2024-05-10 PROBLEM — M35.9 AUTOIMMUNE DISEASE (HCC): Status: ACTIVE | Noted: 2023-01-31

## 2024-05-10 RX ORDER — PHENOL 1.4 %
1 AEROSOL, SPRAY (ML) MUCOUS MEMBRANE
COMMUNITY

## 2024-05-10 RX ORDER — FERROUS GLUCONATE 324(38)MG
324 TABLET ORAL
COMMUNITY

## 2024-05-10 RX ORDER — VITAMIN K2 90 MCG
1 CAPSULE ORAL
COMMUNITY

## 2024-05-10 NOTE — PROGRESS NOTES
[facility]  [address]  [facility] [address]  Dept: [phone]  Dept Fax: [phone]  Loc: [phone]      Alejandra Calderon is a 43 y.o. White female. Alejandra  presents to the [facility] today Alejandra Calderon, was evaluated through a synchronous (real-time) audio-video encounter. The patient (or guardian if applicable) is aware that this is a billable service, which includes applicable co-pays. This Virtual Visit was conducted with patient's (and/or legal guardian's) consent. Patient identification was verified, and a caregiver was present when appropriate. The patient was in their home which was located in a state where the provider was located in his office at [facility] practice in [facility] and licensed to provide care.  for   Chief Complaint   Patient presents with    Discuss Labs   , and;   1. Gastroesophageal reflux disease, unspecified whether esophagitis present    2. Churg-Connie syndrome (HCC)    3. Lipid screening    4. Class 1 obesity without serious comorbidity with body mass index (BMI) of 32.0 to 32.9 in adult, unspecified obesity type    5. Encounter for well adult exam without abnormal findings          I have reviewed Alejandra Calderon medical, surgical and other pertinent history in detail, and have updated medication and allergy information in the computerized patient record.     Clinical Care Team:     -Referring Provider for today's consult: self  -Primary Care Provider: Lars Paul MD    Medical/Surgical History:   She  has a past medical history of Allergic rhinitis, Asthma, Churg-Connie syndrome with lung involvement (HCC), Complication of anesthesia, and SVT (supraventricular tachycardia) ([facility]).  Her  has a past surgical history that includes sinus surgery (); Tonsillectomy and adenoidectomy;  section (2008

## 2024-08-26 ENCOUNTER — PATIENT MESSAGE (OUTPATIENT)
Dept: FAMILY MEDICINE CLINIC | Age: 44
End: 2024-08-26

## 2024-08-26 DIAGNOSIS — M30.1 CHURG-STRAUSS SYNDROME (HCC): ICD-10-CM

## 2024-08-26 DIAGNOSIS — R07.81 RIB PAIN ON RIGHT SIDE: ICD-10-CM

## 2024-08-26 DIAGNOSIS — E66.9 OBESITY WITH SERIOUS COMORBIDITY, UNSPECIFIED CLASSIFICATION, UNSPECIFIED OBESITY TYPE: ICD-10-CM

## 2024-08-26 DIAGNOSIS — D18.03 LIVER HEMANGIOMA: ICD-10-CM

## 2024-08-26 DIAGNOSIS — R07.81 PLEURODYNIA: ICD-10-CM

## 2024-08-26 DIAGNOSIS — D72.18 CHURG-STRAUSS SYNDROME (HCC): ICD-10-CM

## 2024-08-26 DIAGNOSIS — J45.20 MILD INTERMITTENT ASTHMA WITHOUT COMPLICATION: ICD-10-CM

## 2024-08-26 DIAGNOSIS — R91.8 PULMONARY NODULES: ICD-10-CM

## 2024-08-26 DIAGNOSIS — J30.9 ALLERGIC RHINITIS, UNSPECIFIED SEASONALITY, UNSPECIFIED TRIGGER: ICD-10-CM

## 2024-08-26 DIAGNOSIS — K21.9 GASTROESOPHAGEAL REFLUX DISEASE, UNSPECIFIED WHETHER ESOPHAGITIS PRESENT: Primary | ICD-10-CM

## 2024-08-26 DIAGNOSIS — Z00.00 LABORATORY EXAM ORDERED AS PART OF ROUTINE GENERAL MEDICAL EXAMINATION: ICD-10-CM

## 2024-08-26 DIAGNOSIS — Z71.89 ENCOUNTER FOR HERB AND VITAMIN SUPPLEMENT MANAGEMENT: ICD-10-CM

## 2024-08-26 DIAGNOSIS — E66.9 CLASS 1 OBESITY WITHOUT SERIOUS COMORBIDITY WITH BODY MASS INDEX (BMI) OF 32.0 TO 32.9 IN ADULT, UNSPECIFIED OBESITY TYPE: ICD-10-CM

## 2024-08-26 DIAGNOSIS — Z13.220 LIPID SCREENING: ICD-10-CM

## 2024-08-27 NOTE — TELEPHONE ENCOUNTER
Lab orders pended, did not do thyroid, not sure if appropriate with  problem list.    Please review, add diagnosis, approve or deny

## 2024-10-22 ENCOUNTER — LAB (OUTPATIENT)
Dept: LAB | Age: 44
End: 2024-10-22

## 2024-10-28 LAB — CYTOLOGY THIN PREP PAP: NORMAL

## 2024-11-05 ENCOUNTER — OFFICE VISIT (OUTPATIENT)
Dept: RHEUMATOLOGY | Age: 44
End: 2024-11-05
Payer: COMMERCIAL

## 2024-11-05 VITALS
HEART RATE: 81 BPM | HEIGHT: 67 IN | BODY MASS INDEX: 36.41 KG/M2 | OXYGEN SATURATION: 99 % | DIASTOLIC BLOOD PRESSURE: 86 MMHG | SYSTOLIC BLOOD PRESSURE: 138 MMHG | WEIGHT: 232 LBS

## 2024-11-05 DIAGNOSIS — M30.1 CHURG-STRAUSS SYNDROME (HCC): ICD-10-CM

## 2024-11-05 DIAGNOSIS — Z79.52 CURRENT CHRONIC USE OF SYSTEMIC STEROIDS: ICD-10-CM

## 2024-11-05 DIAGNOSIS — M30.1 CHURG-STRAUSS SYNDROME (HCC): Primary | ICD-10-CM

## 2024-11-05 DIAGNOSIS — D72.18 CHURG-STRAUSS SYNDROME (HCC): ICD-10-CM

## 2024-11-05 DIAGNOSIS — R91.8 PULMONARY NODULES: ICD-10-CM

## 2024-11-05 DIAGNOSIS — D72.18 CHURG-STRAUSS SYNDROME (HCC): Primary | ICD-10-CM

## 2024-11-05 DIAGNOSIS — Z51.81 MEDICATION MONITORING ENCOUNTER: ICD-10-CM

## 2024-11-05 DIAGNOSIS — J33.9 NASAL POLYPS: ICD-10-CM

## 2024-11-05 PROCEDURE — 99215 OFFICE O/P EST HI 40 MIN: CPT | Performed by: INTERNAL MEDICINE

## 2024-11-05 PROCEDURE — G8484 FLU IMMUNIZE NO ADMIN: HCPCS | Performed by: INTERNAL MEDICINE

## 2024-11-05 PROCEDURE — G8417 CALC BMI ABV UP PARAM F/U: HCPCS | Performed by: INTERNAL MEDICINE

## 2024-11-05 PROCEDURE — G8427 DOCREV CUR MEDS BY ELIG CLIN: HCPCS | Performed by: INTERNAL MEDICINE

## 2024-11-05 PROCEDURE — 1036F TOBACCO NON-USER: CPT | Performed by: INTERNAL MEDICINE

## 2024-11-05 RX ORDER — PREDNISONE 1 MG/1
1 TABLET ORAL DAILY
Qty: 30 TABLET | Refills: 1 | Status: SHIPPED | OUTPATIENT
Start: 2024-11-05

## 2024-11-05 RX ORDER — PREDNISONE 5 MG/1
5 TABLET ORAL 3 TIMES DAILY
Qty: 90 TABLET | Refills: 1 | Status: SHIPPED | OUTPATIENT
Start: 2024-11-05

## 2024-11-05 ASSESSMENT — ENCOUNTER SYMPTOMS
COUGH: 1
EYES NEGATIVE: 1
GASTROINTESTINAL NEGATIVE: 1
WHEEZING: 1

## 2024-11-05 NOTE — PROGRESS NOTES
[facility] RHEUMATOLOGY FOLLOW UP NOTE       Date Of Service: 11/5/2024  Provider: MELANIA REDD DO ,   PCP: Lars Paul MD   Name: Alejandra Calderon   MRN: [id number]      Subjective   Cc: Follow-up (1 year f/u churg connie syndrome/)     Alejandra Calderon  is a(n)44 y.o. female here for the f/u evaluation of EGPA, medication monitoring     Interval hx:     - Increased asthma requiring rescue inhaler 1-2 times per week and increased cough. She did note  decrease sense of smell since vacation to [place], [place] in July 2024. - increased prednisone to 20mg and then after weaning she noted increased asthma and decreased sense of smells. Currently on prednisone 10mg daily for the past 6 to 8 weeks.     - Recently started seeing Dr. 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 Weiss Allergist at [place] - using anti-histamines and prilosec. Controlled with current medications     EGPA - mild intermittent sinus and ear congestion RARE epistaxis. Denies  nasal or ear discharged.   + hoursenss of thee voice stable and unchanged.   Asthma symptoms as above. + cough.   Sinus congestion stable.   Denies urinary changes, neuropathy, weakness, arthralgia, GI symptoms.        REVIEW OF SYSTEMS: (ROS)    Review of Systems   Constitutional: Negative.  Negative for fatigue.   HENT:  Positive for congestion.    Eyes: Negative.    Respiratory:  Positive for cough and wheezing.    Cardiovascular: Negative.    Gastrointestinal: Negative.    Endocrine: Negative.    Genitourinary: Negative.    Skin: Negative.    Neurological: Negative.  Negative for weakness.   Hematological: Negative.        PmHx:  has a past medical history of Allergic rhinitis, Asthma, Churg-Connie syndrome with lung involvement (HCC), Complication of anesthesia, and SVT (supraventricular tachycardia) ([facility]).     Social History:  reports that she has

## 2024-11-09 ENCOUNTER — HOSPITAL ENCOUNTER (OUTPATIENT)
Dept: CT IMAGING | Age: 44
Discharge: HOME | End: 2024-11-09
Attending: INTERNAL MEDICINE
Payer: COMMERCIAL

## 2024-11-09 DIAGNOSIS — M30.1 CHURG-STRAUSS SYNDROME (HCC): ICD-10-CM

## 2024-11-09 DIAGNOSIS — R91.8 PULMONARY NODULES: ICD-10-CM

## 2024-11-09 DIAGNOSIS — D72.18 CHURG-STRAUSS SYNDROME (HCC): ICD-10-CM

## 2024-11-09 PROCEDURE — 71250 CT THORAX DX C-: CPT

## 2025-02-18 DIAGNOSIS — M30.1 CHURG-STRAUSS SYNDROME (HCC): ICD-10-CM

## 2025-02-18 DIAGNOSIS — D72.18 CHURG-STRAUSS SYNDROME (HCC): ICD-10-CM

## 2025-02-18 RX ORDER — PREDNISONE 5 MG/1
5 TABLET ORAL 3 TIMES DAILY
Qty: 90 TABLET | Refills: 1 | Status: SHIPPED | OUTPATIENT
Start: 2025-02-18

## 2025-02-18 NOTE — TELEPHONE ENCOUNTER
Alejandra Calderon called requesting a refill on the following medications:  Requested Prescriptions     Pending Prescriptions Disp Refills    predniSONE (DELTASONE) 5 MG tablet 90 tablet 1     Sig: Take 1 tablet by mouth in the morning, at noon, and at bedtime     Pharmacy verified:[facility]  .      Date of last visit: 11/5/24  Date of next visit (if applicable): 3/11/2025    Patient is requesting a 1yr supply of this medication as she has always gotten per patient

## 2025-03-05 NOTE — PROGRESS NOTES
[facility]  [address]  [facility] [address]  Dept: [phone]  Dept Fax: [phone]  YAZMIN Calderon is a 44 y.o.female    Pt presents for annual wellness physical exam.      Patient also presents for follow-up of Churg-Connie syndrome, allergic rhinitis, GERD, obesity and mild intermittent asthma     Of note, patient continues following with specialists as noted below:  Pt continues seeing Dr. Elizabet Sanz ([facility]- Rheumatology) for Churg-Connie.   Pt seeing Dr. Foster Carvajal locally for Churg-Connie  Pt continues seeing Dr. Ashly Walsh ([place], [place]- Asthma and Allergy)   Pt saw Dr. Weiss at [facility] (Mast Cell Specialist)- diagnosed with Mast Cell Activation Syndrome- no follow up needed at this time.     Patient doing ok overall at this time-denies any new complaints    Glucometer readings at home are not needed.    The home BP readings have not been needed.     Wt Readings from Last 3 Encounters:   03/13/25 103 kg (227 lb)   11/05/24 105.2 kg (232 lb)   04/18/24 104.8 kg (231 lb)   Weight increased 6# since last visit 18 months ago.      Pt stable since last visit- no new problems for diagnoses listed below:  Patient Active Problem List   Diagnosis    Churg-Connie syndrome (HCC)    Allergic rhinitis    Chronic sinusitis    Gastroesophageal reflux disease    Mild intermittent asthma without complication    Nasal polyps    COVID-19    Class 1 obesity due to excess calories with body mass index (BMI) of 32.0 to 32.9 in adult    Autoimmune disease    Eosinophilia    Rash    Mast cell activation syndrome       Review of Systems   Constitutional:  Negative for chills, diaphoresis, fatigue, fever and unexpected weight change.   Eyes:  Negative for visual disturbance.   Respiratory:  Negative for chest tightness and shortness of breath.    Cardiovascular:  Negative for chest pain, palpitations and leg swelling.

## 2025-03-13 ENCOUNTER — OFFICE VISIT (OUTPATIENT)
Dept: FAMILY MEDICINE CLINIC | Age: 45
End: 2025-03-13

## 2025-03-13 VITALS
SYSTOLIC BLOOD PRESSURE: 122 MMHG | DIASTOLIC BLOOD PRESSURE: 70 MMHG | OXYGEN SATURATION: 97 % | TEMPERATURE: 97 F | HEART RATE: 79 BPM | HEIGHT: 67 IN | RESPIRATION RATE: 16 BRPM | BODY MASS INDEX: 35.63 KG/M2 | WEIGHT: 227 LBS

## 2025-03-13 DIAGNOSIS — D72.18 CHURG-STRAUSS SYNDROME (HCC): ICD-10-CM

## 2025-03-13 DIAGNOSIS — Z00.00 LABORATORY EXAM ORDERED AS PART OF ROUTINE GENERAL MEDICAL EXAMINATION: ICD-10-CM

## 2025-03-13 DIAGNOSIS — Z13.220 LIPID SCREENING: ICD-10-CM

## 2025-03-13 DIAGNOSIS — E66.811 CLASS 1 OBESITY WITHOUT SERIOUS COMORBIDITY WITH BODY MASS INDEX (BMI) OF 32.0 TO 32.9 IN ADULT, UNSPECIFIED OBESITY TYPE: ICD-10-CM

## 2025-03-13 DIAGNOSIS — D89.40 MAST CELL ACTIVATION SYNDROME: ICD-10-CM

## 2025-03-13 DIAGNOSIS — K21.9 GASTROESOPHAGEAL REFLUX DISEASE, UNSPECIFIED WHETHER ESOPHAGITIS PRESENT: ICD-10-CM

## 2025-03-13 DIAGNOSIS — R91.8 PULMONARY NODULES: ICD-10-CM

## 2025-03-13 DIAGNOSIS — M30.1 CHURG-STRAUSS SYNDROME (HCC): ICD-10-CM

## 2025-03-13 DIAGNOSIS — J30.9 ALLERGIC RHINITIS, UNSPECIFIED SEASONALITY, UNSPECIFIED TRIGGER: ICD-10-CM

## 2025-03-13 DIAGNOSIS — J45.20 MILD INTERMITTENT ASTHMA WITHOUT COMPLICATION: ICD-10-CM

## 2025-03-13 DIAGNOSIS — R73.01 IFG (IMPAIRED FASTING GLUCOSE): ICD-10-CM

## 2025-03-13 DIAGNOSIS — Z12.11 COLON CANCER SCREENING: ICD-10-CM

## 2025-03-13 DIAGNOSIS — R53.83 OTHER FATIGUE: ICD-10-CM

## 2025-03-13 DIAGNOSIS — Z00.00 WELL ADULT EXAM: Primary | ICD-10-CM

## 2025-03-13 RX ORDER — OMEPRAZOLE 20 MG/1
20 CAPSULE, DELAYED RELEASE ORAL DAILY
Qty: 90 CAPSULE | Refills: 3 | Status: SHIPPED | OUTPATIENT
Start: 2025-03-13

## 2025-03-13 SDOH — ECONOMIC STABILITY: FOOD INSECURITY: WITHIN THE PAST 12 MONTHS, THE FOOD YOU BOUGHT JUST DIDN'T LAST AND YOU DIDN'T HAVE MONEY TO GET MORE.: NEVER TRUE

## 2025-03-13 SDOH — ECONOMIC STABILITY: FOOD INSECURITY: WITHIN THE PAST 12 MONTHS, YOU WORRIED THAT YOUR FOOD WOULD RUN OUT BEFORE YOU GOT MONEY TO BUY MORE.: NEVER TRUE

## 2025-03-13 ASSESSMENT — ENCOUNTER SYMPTOMS
CONSTIPATION: 0
SHORTNESS OF BREATH: 0
NAUSEA: 0
VOMITING: 0
DIARRHEA: 0
ANAL BLEEDING: 0
ABDOMINAL PAIN: 0
CHEST TIGHTNESS: 0
BLOOD IN STOOL: 0

## 2025-03-13 ASSESSMENT — PATIENT HEALTH QUESTIONNAIRE - PHQ9
SUM OF ALL RESPONSES TO PHQ QUESTIONS 1-9: 0
2. FEELING DOWN, DEPRESSED OR HOPELESS: NOT AT ALL
1. LITTLE INTEREST OR PLEASURE IN DOING THINGS: NOT AT ALL

## 2025-03-13 NOTE — PATIENT INSTRUCTIONS
Follow up with Dr. Foster Carvajal, Dr. Elizabet Sanz ([facility]- Rheumatology), Dr. Ashly Walsh (Allergy/ Asthma), and Dr. Omar Manrique (Sinus) as scheduled/ as needed.   Recheck CT Chest without Contrast after 11/9/2025 for lung nodule follow up.   Decrease Ferrous Gluconate to every other day.   Continue to work on diet, exercise, and weight loss for optimal cardiovascular health.  Continue current medicines.   Refills  Check HG A1c, FLP, CMP, TSH with reflex free T4, CBC, DHEA, B12/folate, and magnesium at this time  Follow up in 12 months with me or sooner if needed.          Preventive Health Topics (updated 3/13/2025):  Encouraged COVID VACCINE booster- pt declines.   Encouraged FLU VACCINE- pt declines.    Encouraged TETANUS SHOT (TdaP/ ADACEL/ BOOSTRIX)- check with local pharmacy  Encouraged PREVNAR 20 after 11/26/2024 (5 years after last dose of PPSV 23)- please check with Rheumatology prior.   PAP/PELVIC management per Dr. MILLER- last appt 11/2024- to follow up annually  MAMMO to be done after 11/19/2025  Encouraged screening COLONOSCOPY-patient would like to proceed with Cologuard at this time.   DEXA management per Dr. Elizabet Sanz at [facility] (done at [facility]) due to persistent steroid use- pt due at this time!- pt to pursue per [facility].

## 2025-03-28 ENCOUNTER — RESULTS FOLLOW-UP (OUTPATIENT)
Dept: FAMILY MEDICINE CLINIC | Age: 45
End: 2025-03-28

## 2025-03-28 LAB — NONINV COLON CA DNA+OCC BLD SCRN STL QL: NEGATIVE

## (undated) DEVICE — GOWN,SIRUS,NON REINFRCD,LARGE,SET IN SL: Brand: MEDLINE

## (undated) DEVICE — SOLUTION IRRIG 250ML STRL H2O PLAS POUR BTL USP

## (undated) DEVICE — YANKAUER,BULB TIP,W/O VENT,RIGID,STERILE: Brand: MEDLINE

## (undated) DEVICE — SOLUTION IV 1000ML 0.9% SOD CHL PH 5 INJ USP VIAFLX PLAS

## (undated) DEVICE — GLOVE ORANGE PI 7 1/2   MSG9075

## (undated) DEVICE — ENDO KIT: Brand: MEDLINE INDUSTRIES, INC.

## (undated) DEVICE — TUBING, SUCTION, 1/4" X 20', STRAIGHT: Brand: MEDLINE INDUSTRIES, INC.

## (undated) DEVICE — SUTURE VCRL SZ 3-0 L27IN ABSRB UD FS-2 L19MM 1/2 CIR J423H

## (undated) DEVICE — AGENT HEMSTAT 3GM PURIFIED PLNT STARCH PWD ABSRB ARISTA AH

## (undated) DEVICE — EVACUATOR SURG 100CC SIL BLB SUCT RESVR FOR CLS WND DRNGE

## (undated) DEVICE — RETRIEVAL BALLOON CATHETER: Brand: EXTRACTOR™ PRO RX

## (undated) DEVICE — NEEDLE INSUF L120MM DIA2MM DISP FOR PNEUMOPERI ENDOPATH

## (undated) DEVICE — SEAL

## (undated) DEVICE — GLOVE ORANGE PI 7   MSG9070

## (undated) DEVICE — GARMENT,MEDLINE,DVT,INT,CALF,MED, GEN2: Brand: MEDLINE

## (undated) DEVICE — SOLUTION IV 1000ML LAC RINGERS PH 6.5 INJ USP VIAFLX PLAS

## (undated) DEVICE — CONTAINER,SPECIMEN,PNEU TUBE,4OZ,OR STRL: Brand: MEDLINE

## (undated) DEVICE — CONMED SCOPE SAVER BITE BLOCK, 20X27 MM: Brand: SCOPE SAVER

## (undated) DEVICE — COVER ARMBRD W13XL28.5IN IMPERV BLU FOR OP RM

## (undated) DEVICE — STRIP,CLOSURE,WOUND,MEDI-STRIP,1/2X4: Brand: MEDLINE

## (undated) DEVICE — SPONGE GZ W4XL4IN COT 12 PLY TYP VII WVN C FLD DSGN

## (undated) DEVICE — BLADELESS OBTURATOR: Brand: WECK VISTA

## (undated) DEVICE — YANKAUER,POOLE TIP,STERILE,50/CS: Brand: MEDLINE

## (undated) DEVICE — SPHINCTEROTOME: Brand: HYDRATOME RX 44

## (undated) DEVICE — BANDAGE ADH W1XL3IN NAT FAB WVN FLX DURABLE N ADH PD SEAL

## (undated) DEVICE — REDUCER: Brand: ENDOWRIST

## (undated) DEVICE — CONTRAST IOTHALAMATE MEGLUMINE 60% 50 ML INJ CONRAY 60

## (undated) DEVICE — 3M™ WARMING BLANKET, UPPER BODY, 10 PER CASE, 42268: Brand: BAIR HUGGER™

## (undated) DEVICE — LINER SUCT CANSTR 1500CC SEMI RIG W/ POR HYDROPHOBIC SHUT

## (undated) DEVICE — TUBING IV STOPCOCK 48 CM 3 W

## (undated) DEVICE — PATIENT RETURN ELECTRODE, SINGLE-USE, CONTACT QUALITY MONITORING, ADULT, WITH 9FT CORD, FOR PATIENTS WEIGING OVER 33LBS. (15KG): Brand: MEGADYNE

## (undated) DEVICE — SET LNR RED GRN W/ BASE CLEANASCOPE

## (undated) DEVICE — RESUSCITATOR AD MNL O2 RESVR AD MASK

## (undated) DEVICE — SYSTEM BX CAP BILI RAP EXCHG CAP LOK DEV COMPATIBLE W/ OLY